# Patient Record
Sex: MALE | Race: WHITE | NOT HISPANIC OR LATINO | Employment: FULL TIME | URBAN - METROPOLITAN AREA
[De-identification: names, ages, dates, MRNs, and addresses within clinical notes are randomized per-mention and may not be internally consistent; named-entity substitution may affect disease eponyms.]

---

## 2023-03-30 DIAGNOSIS — G47.19 EXCESSIVE DAYTIME SLEEPINESS: ICD-10-CM

## 2023-03-31 PROBLEM — F41.9 ANXIETY: Status: ACTIVE | Noted: 2023-03-31

## 2023-03-31 PROBLEM — E78.5 HYPERLIPIDEMIA: Status: ACTIVE | Noted: 2023-03-31

## 2023-03-31 PROBLEM — K58.9 IBS (IRRITABLE BOWEL SYNDROME): Status: ACTIVE | Noted: 2023-03-31

## 2023-03-31 PROBLEM — R06.2 WHEEZE: Status: ACTIVE | Noted: 2023-03-31

## 2023-03-31 PROBLEM — I10 HYPERTENSION: Status: ACTIVE | Noted: 2023-03-31

## 2023-03-31 PROBLEM — R53.83 FATIGUE: Status: ACTIVE | Noted: 2023-03-31

## 2023-03-31 PROBLEM — G47.19 EXCESSIVE DAYTIME SLEEPINESS: Status: ACTIVE | Noted: 2023-03-31

## 2023-03-31 PROBLEM — N40.1 ENLARGED PROSTATE WITH LOWER URINARY TRACT SYMPTOMS (LUTS): Status: ACTIVE | Noted: 2023-03-31

## 2023-03-31 PROBLEM — K21.9 GERD (GASTROESOPHAGEAL REFLUX DISEASE): Status: ACTIVE | Noted: 2023-03-31

## 2023-03-31 PROBLEM — N52.9 ED (ERECTILE DYSFUNCTION): Status: ACTIVE | Noted: 2023-03-31

## 2023-03-31 PROBLEM — R61 HYPERHIDROSIS: Status: ACTIVE | Noted: 2023-03-31

## 2023-03-31 PROBLEM — G47.33 OSA (OBSTRUCTIVE SLEEP APNEA): Status: ACTIVE | Noted: 2023-03-31

## 2023-03-31 RX ORDER — GLYCOPYRROLATE 1 MG/1
1 TABLET ORAL 3 TIMES DAILY
COMMUNITY
Start: 2021-12-14

## 2023-03-31 RX ORDER — MODAFINIL 200 MG/1
TABLET ORAL
Qty: 90 TABLET | Refills: 1 | Status: SHIPPED | OUTPATIENT
Start: 2023-03-31 | End: 2024-01-03

## 2023-03-31 RX ORDER — ALBUTEROL SULFATE 90 UG/1
2 AEROSOL, METERED RESPIRATORY (INHALATION) EVERY 4 HOURS PRN
COMMUNITY
Start: 2019-05-01

## 2023-03-31 RX ORDER — EPINEPHRINE 0.3 MG/.3ML
1 INJECTION SUBCUTANEOUS AS NEEDED
COMMUNITY
Start: 2014-08-13

## 2023-03-31 RX ORDER — SODIUM FLUORIDE/POTASSIUM NIT 1.1 %-5 %
1 PASTE (ML) DENTAL 3 TIMES DAILY
COMMUNITY
Start: 2021-08-10

## 2023-03-31 RX ORDER — SILDENAFIL 100 MG/1
.5-1 TABLET, FILM COATED ORAL AS NEEDED
COMMUNITY
End: 2023-11-01

## 2023-03-31 RX ORDER — VERAPAMIL HYDROCHLORIDE 120 MG/1
120 TABLET, FILM COATED, EXTENDED RELEASE ORAL NIGHTLY
COMMUNITY
Start: 2023-02-06 | End: 2023-11-01

## 2023-03-31 RX ORDER — LOSARTAN POTASSIUM 100 MG/1
100 TABLET ORAL DAILY
COMMUNITY
End: 2023-08-14

## 2023-03-31 RX ORDER — MODAFINIL 200 MG/1
200 TABLET ORAL DAILY
COMMUNITY
End: 2023-03-31 | Stop reason: SDUPTHER

## 2023-03-31 RX ORDER — FELODIPINE 10 MG/1
1 TABLET, EXTENDED RELEASE ORAL DAILY
COMMUNITY
Start: 2023-02-06 | End: 2023-11-01

## 2023-03-31 RX ORDER — ESCITALOPRAM OXALATE 20 MG/1
40 TABLET ORAL DAILY
COMMUNITY
End: 2023-04-14

## 2023-03-31 NOTE — TELEPHONE ENCOUNTER
I have personally reviewed the OARRS report for this patient.  I have considered the risks of abuse, dependence, addiction, and diversion.  I believe that it is clinically appropriate for this patient to be prescribed this medication based on the documented diagnosis.  Mil Steele MD

## 2023-04-13 DIAGNOSIS — F41.9 ANXIETY: ICD-10-CM

## 2023-04-14 RX ORDER — ESCITALOPRAM OXALATE 20 MG/1
TABLET ORAL
Qty: 60 TABLET | Refills: 11 | Status: SHIPPED | OUTPATIENT
Start: 2023-04-14 | End: 2024-04-22 | Stop reason: SDUPTHER

## 2023-05-06 PROBLEM — Z00.00 WELL ADULT EXAM: Status: ACTIVE | Noted: 2023-05-06

## 2023-05-06 PROBLEM — R06.2 WHEEZE: Status: RESOLVED | Noted: 2023-03-31 | Resolved: 2023-05-06

## 2023-05-06 PROBLEM — G44.019 EPISODIC CLUSTER HEADACHE, NOT INTRACTABLE: Status: ACTIVE | Noted: 2023-05-06

## 2023-08-12 DIAGNOSIS — I10 PRIMARY HYPERTENSION: ICD-10-CM

## 2023-08-14 RX ORDER — LOSARTAN POTASSIUM 100 MG/1
100 TABLET ORAL DAILY
Qty: 90 TABLET | Refills: 3 | Status: SHIPPED | OUTPATIENT
Start: 2023-08-14 | End: 2024-05-06

## 2023-10-31 DIAGNOSIS — I10 PRIMARY HYPERTENSION: ICD-10-CM

## 2023-10-31 DIAGNOSIS — N52.9 ERECTILE DYSFUNCTION, UNSPECIFIED ERECTILE DYSFUNCTION TYPE: ICD-10-CM

## 2023-11-01 RX ORDER — VERAPAMIL HYDROCHLORIDE 120 MG/1
120 TABLET, FILM COATED, EXTENDED RELEASE ORAL NIGHTLY
Qty: 90 TABLET | Refills: 1 | Status: SHIPPED | OUTPATIENT
Start: 2023-11-01 | End: 2024-05-06

## 2023-11-01 RX ORDER — SILDENAFIL 100 MG/1
TABLET, FILM COATED ORAL
Qty: 30 TABLET | Refills: 11 | Status: SHIPPED | OUTPATIENT
Start: 2023-11-01

## 2023-11-01 RX ORDER — FELODIPINE 10 MG/1
TABLET, EXTENDED RELEASE ORAL
Qty: 90 TABLET | Refills: 1 | Status: SHIPPED | OUTPATIENT
Start: 2023-11-01 | End: 2024-06-05 | Stop reason: SDUPTHER

## 2023-11-22 ENCOUNTER — LAB (OUTPATIENT)
Dept: LAB | Facility: LAB | Age: 63
End: 2023-11-22
Payer: COMMERCIAL

## 2023-11-22 ENCOUNTER — OFFICE VISIT (OUTPATIENT)
Dept: PRIMARY CARE | Facility: CLINIC | Age: 63
End: 2023-11-22
Payer: COMMERCIAL

## 2023-11-22 VITALS
BODY MASS INDEX: 29.19 KG/M2 | HEART RATE: 117 BPM | HEIGHT: 68 IN | TEMPERATURE: 97.6 F | WEIGHT: 192.6 LBS | DIASTOLIC BLOOD PRESSURE: 90 MMHG | SYSTOLIC BLOOD PRESSURE: 140 MMHG | RESPIRATION RATE: 16 BRPM | OXYGEN SATURATION: 94 %

## 2023-11-22 DIAGNOSIS — G44.019 EPISODIC CLUSTER HEADACHE, NOT INTRACTABLE: ICD-10-CM

## 2023-11-22 DIAGNOSIS — G47.33 OSA (OBSTRUCTIVE SLEEP APNEA): ICD-10-CM

## 2023-11-22 DIAGNOSIS — Z12.5 SPECIAL SCREENING FOR MALIGNANT NEOPLASM OF PROSTATE: ICD-10-CM

## 2023-11-22 DIAGNOSIS — E78.5 HYPERLIPIDEMIA, UNSPECIFIED HYPERLIPIDEMIA TYPE: Primary | ICD-10-CM

## 2023-11-22 DIAGNOSIS — Z00.00 WELL ADULT EXAM: ICD-10-CM

## 2023-11-22 DIAGNOSIS — I10 PRIMARY HYPERTENSION: ICD-10-CM

## 2023-11-22 LAB
ERYTHROCYTE [DISTWIDTH] IN BLOOD BY AUTOMATED COUNT: 12.5 % (ref 11.5–14.5)
HCT VFR BLD AUTO: 42.4 % (ref 41–52)
HGB BLD-MCNC: 14.7 G/DL (ref 13.5–17.5)
MCH RBC QN AUTO: 30.9 PG (ref 26–34)
MCHC RBC AUTO-ENTMCNC: 34.7 G/DL (ref 32–36)
MCV RBC AUTO: 89 FL (ref 80–100)
NRBC BLD-RTO: 0 /100 WBCS (ref 0–0)
PLATELET # BLD AUTO: 266 X10*3/UL (ref 150–450)
RBC # BLD AUTO: 4.75 X10*6/UL (ref 4.5–5.9)
WBC # BLD AUTO: 5.7 X10*3/UL (ref 4.4–11.3)

## 2023-11-22 PROCEDURE — 80053 COMPREHEN METABOLIC PANEL: CPT

## 2023-11-22 PROCEDURE — 36415 COLL VENOUS BLD VENIPUNCTURE: CPT

## 2023-11-22 PROCEDURE — 80061 LIPID PANEL: CPT

## 2023-11-22 PROCEDURE — 3077F SYST BP >= 140 MM HG: CPT | Performed by: FAMILY MEDICINE

## 2023-11-22 PROCEDURE — 84153 ASSAY OF PSA TOTAL: CPT

## 2023-11-22 PROCEDURE — 1036F TOBACCO NON-USER: CPT | Performed by: FAMILY MEDICINE

## 2023-11-22 PROCEDURE — 85027 COMPLETE CBC AUTOMATED: CPT

## 2023-11-22 PROCEDURE — 3080F DIAST BP >= 90 MM HG: CPT | Performed by: FAMILY MEDICINE

## 2023-11-22 PROCEDURE — 99396 PREV VISIT EST AGE 40-64: CPT | Performed by: FAMILY MEDICINE

## 2023-11-22 ASSESSMENT — ENCOUNTER SYMPTOMS
COUGH: 0
NECK STIFFNESS: 0
HALLUCINATIONS: 0
WEAKNESS: 0
EYE REDNESS: 0
DYSURIA: 0
BACK PAIN: 0
OCCASIONAL FEELINGS OF UNSTEADINESS: 0
CHILLS: 0
WOUND: 0
RHINORRHEA: 0
FEVER: 0
HEADACHES: 0
ABDOMINAL PAIN: 0
SHORTNESS OF BREATH: 0
ADENOPATHY: 0
BLOOD IN STOOL: 0
DEPRESSION: 0
LOSS OF SENSATION IN FEET: 0
PALPITATIONS: 0
FATIGUE: 0
HEMATURIA: 0
SORE THROAT: 0
POLYDIPSIA: 0
BRUISES/BLEEDS EASILY: 0
EYE PAIN: 0

## 2023-11-22 ASSESSMENT — PATIENT HEALTH QUESTIONNAIRE - PHQ9
1. LITTLE INTEREST OR PLEASURE IN DOING THINGS: NOT AT ALL
SUM OF ALL RESPONSES TO PHQ9 QUESTIONS 1 AND 2: 0
2. FEELING DOWN, DEPRESSED OR HOPELESS: NOT AT ALL

## 2023-11-22 NOTE — PROGRESS NOTES
Jeff Rayo is a 63 y.o. male with Chief Complaint of Annual Exam (CPE).  And recheck on HTN, HLD.     Past Surgical History:   Procedure Laterality Date    COLONOSCOPY  05/07/2014    Complete Colonoscopy    ESOPHAGOGASTRODUODENOSCOPY  05/07/2014    Diagnostic Esophagogastroduodenoscopy    KNEE ARTHROSCOPY W/ DEBRIDEMENT  05/07/2014    Arthroscopy Knee Right    MR HEAD ANGIO WO IV CONTRAST  6/1/2014    MR HEAD ANGIO WO IV CONTRAST 6/1/2014 [facility]    MR HEAD ANGIO WO IV CONTRAST  6/1/2014    MR HEAD ANGIO WO IV CONTRAST 6/1/2014 [facility]    MR NECK ANGIO WO IV CONTRAST  6/1/2014    MR NECK ANGIO WO IV CONTRAST 6/1/2014 [facility]      Social History     Socioeconomic History    Marital status:      Spouse name: Not on file    Number of children: Not on file    Years of education: Not on file    Highest education level: Not on file   Occupational History    Not on file   Tobacco Use    Smoking status: Never     Passive exposure: Never    Smokeless tobacco: Never   Vaping Use    Vaping Use: Never used   Substance and Sexual Activity    Alcohol use: Yes    Drug use: Never    Sexual activity: Not on file   Other Topics Concern    Not on file   Social History Narrative    Not on file     Social Determinants of Health     Financial Resource Strain: Not on file   Food Insecurity: Not on file   Transportation Needs: Not on file   Physical Activity: Not on file   Stress: Not on file   Social Connections: Not on file   Intimate Partner Violence: Not on file   Housing Stability: Not on file     Past Medical History:   Diagnosis Date    Noninfective gastroenteritis and colitis, unspecified 08/17/2016    Colitis    Personal history of diseases of the skin and subcutaneous tissue 10/22/2014    History of urticaria    Personal history of diseases of the skin and subcutaneous tissue 06/03/2015    History of urticaria    Personal history of other diseases of the digestive system  "10/21/2019    History of dental abscess    Personal history of other diseases of the nervous system and sense organs 05/03/2021    History of cluster headache    Personal history of other diseases of the respiratory system 03/09/2016    History of sinusitis    Unspecified symptoms and signs involving the genitourinary system 09/27/2019    UTI symptoms      No family history on file.     Review of Systems   Constitutional:  Negative for chills, fatigue and fever.   HENT:  Negative for rhinorrhea and sore throat.    Eyes:  Negative for pain and redness.   Respiratory:  Negative for cough and shortness of breath.    Cardiovascular:  Negative for chest pain and palpitations.   Gastrointestinal:  Negative for abdominal pain and blood in stool.   Endocrine: Negative for polydipsia and polyuria.   Genitourinary:  Negative for dysuria and hematuria.   Musculoskeletal:  Negative for back pain and neck stiffness.   Skin:  Negative for rash and wound.   Allergic/Immunologic: Negative for environmental allergies and food allergies.   Neurological:  Negative for weakness and headaches.   Hematological:  Negative for adenopathy. Does not bruise/bleed easily.   Psychiatric/Behavioral:  Negative for hallucinations and suicidal ideas.       /90 (BP Location: Left arm, Patient Position: Sitting, BP Cuff Size: Adult)   Pulse (!) 117   Temp 36.4 °C (97.6 °F) (Temporal)   Resp 16   Ht 1.727 m (5' 8\")   Wt 87.4 kg (192 lb 9.6 oz)   SpO2 94%   BMI 29.28 kg/m²   Physical Exam  Vitals reviewed.   Constitutional:       General: He is not in acute distress.     Appearance: He is not ill-appearing.   HENT:      Head: Normocephalic and atraumatic.      Right Ear: Tympanic membrane normal.      Left Ear: Tympanic membrane normal.      Nose: No congestion or rhinorrhea.      Mouth/Throat:      Pharynx: No oropharyngeal exudate or posterior oropharyngeal erythema.   Eyes:      Extraocular Movements: Extraocular movements intact.      " "Conjunctiva/sclera: Conjunctivae normal.      Pupils: Pupils are equal, round, and reactive to light.   Cardiovascular:      Rate and Rhythm: Normal rate and regular rhythm.      Heart sounds: No murmur heard.     No friction rub. No gallop.   Pulmonary:      Effort: Pulmonary effort is normal.      Breath sounds: Normal breath sounds. No wheezing, rhonchi or rales.   Abdominal:      General: There is no distension.      Palpations: Abdomen is soft.      Tenderness: There is no abdominal tenderness. There is no guarding or rebound.   Musculoskeletal:         General: No swelling or deformity.      Cervical back: Normal range of motion and neck supple.      Right lower leg: No edema.      Left lower leg: No edema.   Skin:     Capillary Refill: Capillary refill takes less than 2 seconds.      Coloration: Skin is not jaundiced.      Findings: No rash.   Neurological:      General: No focal deficit present.      Mental Status: He is alert.      Motor: No weakness.   Psychiatric:         Mood and Affect: Mood normal.         Behavior: Behavior normal.       Lab Results   Component Value Date    WBC 5.4 07/14/2022    HGB 15.3 07/14/2022    HCT 42.6 07/14/2022    MCV 88 07/14/2022     07/14/2022     Lab Results   Component Value Date    CHOL 197 07/13/2022    CHOL 197 11/04/2021    CHOL 209 (H) 10/28/2020     Lab Results   Component Value Date    HDL 86.1 07/13/2022    HDL 76.6 11/04/2021    HDL 97.7 10/28/2020     No results found for: \"LDLCALC\"  Lab Results   Component Value Date    TRIG 50 07/13/2022    TRIG 54 11/04/2021    TRIG 49 10/28/2020     No components found for: \"CHOLHDL\"  No results found for: \"HGBA1C\"    Assessment/Plan   Problem List Items Addressed This Visit       Hyperlipidemia - Primary    Overview     # Fatty liver on CT/US 2016 -- working on losing weight. Has kept 10# off since 2015 and will work on more.          Hypertension    Overview     losartan to 100 mg daily.   Cut back on verapamil " from 180 to 120 ER. (chosen for treatment of cluster headaches).   felodipine to 10 mg daily.          Current Assessment & Plan     Continue current meds.   On 2 CCB for headache control.          MARYANN (obstructive sleep apnea)    Overview     Chronic fatigue due to MARYANN (cannot tolerate CPAP)    Tolerating modafanil.   Stimulant agreement signed 5/4/22.   OARRS reviewed 11/22/23  Tox screen clean 5/4/22     Referred to Dr Sheth to consider Inspire device.          Well adult exam    Overview     11/22/23 Preventative Visit -- declines flu shot and tdap next due 2029. Counseled on healthy diet and exercise.     Colonoscopy (hyperplastic polyp) 2016 -- next due 2026.  Retired at age 57 -- staying part time at [employer], and working at [employer].     Healthful alcohol intake 2 or fewer beers a day.     2/2/2013 ct chest -- Stable pulmonary nodules. 3 mm nodule in rul lateral; 4 mm rul anterior. RLL calcified granuloma. Stable from 2012.     11/10/2021 CACS ZERO!!! no need for statin. MP          Relevant Orders    Lipid panel    Comprehensive metabolic panel    CBC    Episodic cluster headache, not intractable    Overview     Cluster Headaches -- improved with up titration of verapamil.   Pulse holding steady.   Use of prn ibuprofen and maxalt helps.           Other Visit Diagnoses       Special screening for malignant neoplasm of prostate        Relevant Orders    Prostate Specific Antigen

## 2023-11-23 LAB
ALBUMIN SERPL BCP-MCNC: 4.5 G/DL (ref 3.4–5)
ALP SERPL-CCNC: 80 U/L (ref 33–136)
ALT SERPL W P-5'-P-CCNC: 25 U/L (ref 10–52)
ANION GAP SERPL CALC-SCNC: 12 MMOL/L (ref 10–20)
AST SERPL W P-5'-P-CCNC: 19 U/L (ref 9–39)
BILIRUB SERPL-MCNC: 0.5 MG/DL (ref 0–1.2)
BUN SERPL-MCNC: 17 MG/DL (ref 6–23)
CALCIUM SERPL-MCNC: 9.5 MG/DL (ref 8.6–10.6)
CHLORIDE SERPL-SCNC: 106 MMOL/L (ref 98–107)
CHOLEST SERPL-MCNC: 237 MG/DL (ref 0–199)
CHOLESTEROL/HDL RATIO: 2.4
CO2 SERPL-SCNC: 28 MMOL/L (ref 21–32)
CREAT SERPL-MCNC: 0.85 MG/DL (ref 0.5–1.3)
GFR SERPL CREATININE-BSD FRML MDRD: >90 ML/MIN/1.73M*2
GLUCOSE SERPL-MCNC: 87 MG/DL (ref 74–99)
HDLC SERPL-MCNC: 98 MG/DL
LDLC SERPL CALC-MCNC: 124 MG/DL
NON HDL CHOLESTEROL: 139 MG/DL (ref 0–149)
POTASSIUM SERPL-SCNC: 4.2 MMOL/L (ref 3.5–5.3)
PROT SERPL-MCNC: 6.7 G/DL (ref 6.4–8.2)
PSA SERPL-MCNC: 0.62 NG/ML
SODIUM SERPL-SCNC: 142 MMOL/L (ref 136–145)
TRIGL SERPL-MCNC: 75 MG/DL (ref 0–149)
VLDL: 15 MG/DL (ref 0–40)

## 2023-12-30 DIAGNOSIS — G47.19 EXCESSIVE DAYTIME SLEEPINESS: ICD-10-CM

## 2024-01-03 RX ORDER — MODAFINIL 200 MG/1
TABLET ORAL
Qty: 90 TABLET | Refills: 3 | Status: SHIPPED | OUTPATIENT
Start: 2024-01-03 | End: 2024-07-01

## 2024-04-22 DIAGNOSIS — F41.9 ANXIETY: ICD-10-CM

## 2024-04-23 RX ORDER — ESCITALOPRAM OXALATE 20 MG/1
40 TABLET ORAL DAILY
Qty: 60 TABLET | Refills: 11 | Status: SHIPPED | OUTPATIENT
Start: 2024-04-23

## 2024-05-04 DIAGNOSIS — I10 PRIMARY HYPERTENSION: ICD-10-CM

## 2024-05-06 RX ORDER — VERAPAMIL HYDROCHLORIDE 120 MG/1
120 TABLET, FILM COATED, EXTENDED RELEASE ORAL NIGHTLY
Qty: 90 TABLET | Refills: 3 | Status: SHIPPED | OUTPATIENT
Start: 2024-05-06

## 2024-05-06 RX ORDER — LOSARTAN POTASSIUM 100 MG/1
100 TABLET ORAL DAILY
Qty: 90 TABLET | Refills: 3 | Status: SHIPPED | OUTPATIENT
Start: 2024-05-06

## 2024-05-08 ENCOUNTER — APPOINTMENT (OUTPATIENT)
Dept: PRIMARY CARE | Facility: CLINIC | Age: 64
End: 2024-05-08
Payer: COMMERCIAL

## 2024-06-05 ENCOUNTER — OFFICE VISIT (OUTPATIENT)
Dept: PRIMARY CARE | Facility: CLINIC | Age: 64
End: 2024-06-05
Payer: COMMERCIAL

## 2024-06-05 VITALS
OXYGEN SATURATION: 95 % | DIASTOLIC BLOOD PRESSURE: 80 MMHG | RESPIRATION RATE: 16 BRPM | BODY MASS INDEX: 29.94 KG/M2 | SYSTOLIC BLOOD PRESSURE: 122 MMHG | HEART RATE: 65 BPM | WEIGHT: 196.9 LBS

## 2024-06-05 DIAGNOSIS — G47.33 OSA (OBSTRUCTIVE SLEEP APNEA): ICD-10-CM

## 2024-06-05 DIAGNOSIS — Z79.899 DRUG THERAPY: ICD-10-CM

## 2024-06-05 DIAGNOSIS — F41.9 ANXIETY: ICD-10-CM

## 2024-06-05 DIAGNOSIS — E78.5 HYPERLIPIDEMIA, UNSPECIFIED HYPERLIPIDEMIA TYPE: Primary | ICD-10-CM

## 2024-06-05 DIAGNOSIS — I10 PRIMARY HYPERTENSION: ICD-10-CM

## 2024-06-05 DIAGNOSIS — K58.1 IRRITABLE BOWEL SYNDROME WITH CONSTIPATION: ICD-10-CM

## 2024-06-05 DIAGNOSIS — G44.019 EPISODIC CLUSTER HEADACHE, NOT INTRACTABLE: ICD-10-CM

## 2024-06-05 PROCEDURE — 80354 DRUG SCREENING FENTANYL: CPT

## 2024-06-05 PROCEDURE — 3079F DIAST BP 80-89 MM HG: CPT | Performed by: FAMILY MEDICINE

## 2024-06-05 PROCEDURE — 82570 ASSAY OF URINE CREATININE: CPT

## 2024-06-05 PROCEDURE — 80365 DRUG SCREENING OXYCODONE: CPT

## 2024-06-05 PROCEDURE — 80346 BENZODIAZEPINES1-12: CPT

## 2024-06-05 PROCEDURE — 80358 DRUG SCREENING METHADONE: CPT

## 2024-06-05 PROCEDURE — 80373 DRUG SCREENING TRAMADOL: CPT

## 2024-06-05 PROCEDURE — 80361 OPIATES 1 OR MORE: CPT

## 2024-06-05 PROCEDURE — 3074F SYST BP LT 130 MM HG: CPT | Performed by: FAMILY MEDICINE

## 2024-06-05 PROCEDURE — 80307 DRUG TEST PRSMV CHEM ANLYZR: CPT

## 2024-06-05 PROCEDURE — 99214 OFFICE O/P EST MOD 30 MIN: CPT | Performed by: FAMILY MEDICINE

## 2024-06-05 PROCEDURE — 80368 SEDATIVE HYPNOTICS: CPT

## 2024-06-05 RX ORDER — FELODIPINE 5 MG/1
5 TABLET, EXTENDED RELEASE ORAL DAILY
Qty: 90 TABLET | Refills: 3 | Status: SHIPPED | OUTPATIENT
Start: 2024-06-05 | End: 2025-06-05

## 2024-06-05 NOTE — ASSESSMENT & PLAN NOTE
losartan to 100 mg daily.   Tolerated cut in verapamil from 180 to 120 ER. (chosen for treatment of cluster headaches).   Cut felodipine to 5 mg daily. (To wean off duplicate CCB)

## 2024-06-05 NOTE — PROGRESS NOTES
Subjective   Patient ID: Jeff Rayo is a 64 y.o. male who presents for Follow-up (6 month ).  Recheck on chronic stable HTN, HLD and Migraines.     OARRS:  Mil Steele MD on 6/5/2024 10:43 AM  I have personally reviewed the OARRS report for Jeff Rayo. I have considered the risks of abuse, dependence, addiction and diversion and I believe that it is clinically appropriate for Jeff Rayo to be prescribed this medication    Is the patient prescribed a combination of a benzodiazepine and opioid?  No    Last Urine Drug Screen / ordered today: Yes  No results found for this or any previous visit (from the past 8760 hour(s)).  Results are as expected.     Clinical rationale for not completing a Urine Drug Screen: ordered today 6/5/24      Controlled Substance Agreement:  Date of the Last Agreement: 6/5/24  Reviewed Controlled Substance Agreement including but not limited to the benefits, risks, and alternatives to treatment with a Controlled Substance medication(s).    Stimulants:   What is the patient's goal of therapy? Increased alertness during work  Is this being achieved with current treatment? yes    Activities of Daily Living:   Is your overall impression that this patient is benefiting (symptom reduction outweighs side effects) from stimulant therapy? Yes     1. Physical Functioning: Better  2. Family Relationship: Same  3. Social Relationship: Same  4. Mood: Same  5. Sleep Patterns: Same  6. Overall Function: Better      Objective   Visit Vitals  /80 (BP Location: Left arm, Patient Position: Sitting, BP Cuff Size: Adult)   Pulse 65   Resp 16   Wt 89.3 kg (196 lb 14.4 oz)   SpO2 95%   BMI 29.94 kg/m²   Smoking Status Never   BSA 2.07 m²      O: VSS AFEB Awake, Alert, NAD.  No intoxication, withdrawal, or sedation.  Chest CTA.  Heart RRR.  Ext no c/c/e.      Lab Results   Component Value Date    WBC 5.7 11/22/2023    HGB 14.7 11/22/2023    HCT 42.4 11/22/2023    MCV 89  "11/22/2023     11/22/2023       Chemistry    Lab Results   Component Value Date/Time     11/22/2023 1439    K 4.2 11/22/2023 1439     11/22/2023 1439    CO2 28 11/22/2023 1439    BUN 17 11/22/2023 1439    CREATININE 0.85 11/22/2023 1439    Lab Results   Component Value Date/Time    CALCIUM 9.5 11/22/2023 1439    ALKPHOS 80 11/22/2023 1439    AST 19 11/22/2023 1439    ALT 25 11/22/2023 1439    BILITOT 0.5 11/22/2023 1439          Lab Results   Component Value Date    CHOL 237 (H) 11/22/2023    CHOL 197 07/13/2022    CHOL 197 11/04/2021     Lab Results   Component Value Date    HDL 98.0 11/22/2023    HDL 86.1 07/13/2022    HDL 76.6 11/04/2021     Lab Results   Component Value Date    LDLCALC 124 (H) 11/22/2023     Lab Results   Component Value Date    TRIG 75 11/22/2023    TRIG 50 07/13/2022    TRIG 54 11/04/2021     No components found for: \"CHOLHDL\"   No results found for: \"HGBA1C\"    Assessment/Plan   Problem List Items Addressed This Visit       Anxiety    Overview     Did not tolerate prozac, welbutrin, effexor in the past.          Current Assessment & Plan      improved on increase of lexapro 20 mg 2 at night (40 mg) to get through shock of his wife leaving 6/2020 but better on just one a day now.         Hyperlipidemia - Primary    Overview     11/10/21 CACS Zero  11/2023 MERRITT 2%         Current Assessment & Plan     No statin needed at this time.  Continue to work on weight loss and exercise.          Hypertension    Overview     Choice of verapamil for control of cluster headaches.          Current Assessment & Plan     losartan to 100 mg daily.   Tolerated cut in verapamil from 180 to 120 ER. (chosen for treatment of cluster headaches).   Cut felodipine to 5 mg daily. (To wean off duplicate CCB)         IBS (irritable bowel syndrome)    Overview     # IBS - D         Current Assessment & Plan     uses otc anti-diarrheal prn   Improved with cutting back on energy drinks.   Already avoiding " dairy.   Tried pantoprazole from Dr Kate 2016 but this caused worsening diarrhea.   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   OARRS reviewed 11/22/23  Tox screen clean 5/4/22     Referred to Dr ANABELL Verma to consider Inspire device.          Episodic cluster headache, not intractable    Current Assessment & Plan     Stable on verapamil for prevention.   Use of prn ibuprofen and maxalt helps.           Other Visit Diagnoses       Drug therapy

## 2024-06-05 NOTE — ASSESSMENT & PLAN NOTE
uses otc anti-diarrheal prn   Improved with cutting back on energy drinks.   Already avoiding dairy.   Tried pantoprazole from Dr Kate 2016 but this caused worsening diarrhea.   Did not tolerate lomotil in the past (2020)

## 2024-06-05 NOTE — ASSESSMENT & PLAN NOTE
improved on increase of lexapro 20 mg 2 at night (40 mg) to get through shock of his wife leaving 6/2020 but better on just one a day now.

## 2024-06-06 LAB
AMPHETAMINES UR QL SCN: NORMAL
BARBITURATES UR QL SCN: NORMAL
BZE UR QL SCN: NORMAL
CANNABINOIDS UR QL SCN: NORMAL
CREAT UR-MCNC: 135.5 MG/DL (ref 20–370)
PCP UR QL SCN: NORMAL

## 2024-07-28 DIAGNOSIS — G47.19 EXCESSIVE DAYTIME SLEEPINESS: ICD-10-CM

## 2024-07-29 RX ORDER — MODAFINIL 200 MG/1
200 TABLET ORAL DAILY
Qty: 90 TABLET | Refills: 2 | Status: SHIPPED | OUTPATIENT
Start: 2024-07-29 | End: 2025-01-25

## 2024-08-28 ENCOUNTER — TELEPHONE (OUTPATIENT)
Dept: PRIMARY CARE | Facility: CLINIC | Age: 64
End: 2024-08-28
Payer: COMMERCIAL

## 2024-08-28 NOTE — TELEPHONE ENCOUNTER
Pt called stating he received a for 431 dollars for lab work for drug test. Informed pt he is on a controlled medication which requires those test. Pt is still not understanding why he was tested and stated he wasn't aware that he was leaving a urine sample for that. Pt is requesting a call for someone to explain.

## 2024-10-25 DIAGNOSIS — I10 PRIMARY HYPERTENSION: Primary | ICD-10-CM

## 2024-10-28 DIAGNOSIS — N52.9 ERECTILE DYSFUNCTION, UNSPECIFIED ERECTILE DYSFUNCTION TYPE: ICD-10-CM

## 2024-10-28 RX ORDER — FELODIPINE 10 MG/1
TABLET, EXTENDED RELEASE ORAL
Qty: 90 TABLET | Refills: 3 | Status: SHIPPED | OUTPATIENT
Start: 2024-10-28

## 2024-10-28 RX ORDER — SILDENAFIL 100 MG/1
TABLET, FILM COATED ORAL
Qty: 30 TABLET | Refills: 3 | Status: SHIPPED | OUTPATIENT
Start: 2024-10-28

## 2024-11-27 ENCOUNTER — APPOINTMENT (OUTPATIENT)
Dept: PRIMARY CARE | Facility: CLINIC | Age: 64
End: 2024-11-27
Payer: COMMERCIAL

## 2024-11-27 ENCOUNTER — LAB (OUTPATIENT)
Dept: LAB | Facility: LAB | Age: 64
End: 2024-11-27
Payer: COMMERCIAL

## 2024-11-27 VITALS
OXYGEN SATURATION: 95 % | WEIGHT: 194 LBS | TEMPERATURE: 97.5 F | BODY MASS INDEX: 29.5 KG/M2 | SYSTOLIC BLOOD PRESSURE: 140 MMHG | HEART RATE: 66 BPM | DIASTOLIC BLOOD PRESSURE: 100 MMHG

## 2024-11-27 DIAGNOSIS — R06.2 WHEEZE: Primary | ICD-10-CM

## 2024-11-27 DIAGNOSIS — E78.5 HYPERLIPIDEMIA, UNSPECIFIED HYPERLIPIDEMIA TYPE: ICD-10-CM

## 2024-11-27 DIAGNOSIS — Z12.5 SCREENING FOR PROSTATE CANCER: ICD-10-CM

## 2024-11-27 DIAGNOSIS — Z00.00 WELL ADULT EXAM: ICD-10-CM

## 2024-11-27 DIAGNOSIS — K21.9 GASTROESOPHAGEAL REFLUX DISEASE WITHOUT ESOPHAGITIS: ICD-10-CM

## 2024-11-27 DIAGNOSIS — R35.0 BENIGN PROSTATIC HYPERPLASIA WITH URINARY FREQUENCY: ICD-10-CM

## 2024-11-27 DIAGNOSIS — M62.830 SPASM OF RIGHT TRAPEZIUS MUSCLE: ICD-10-CM

## 2024-11-27 DIAGNOSIS — Z79.899 DRUG THERAPY: ICD-10-CM

## 2024-11-27 DIAGNOSIS — G47.33 OSA (OBSTRUCTIVE SLEEP APNEA): ICD-10-CM

## 2024-11-27 DIAGNOSIS — G47.19 EXCESSIVE DAYTIME SLEEPINESS: ICD-10-CM

## 2024-11-27 DIAGNOSIS — N52.9 ERECTILE DYSFUNCTION, UNSPECIFIED ERECTILE DYSFUNCTION TYPE: ICD-10-CM

## 2024-11-27 DIAGNOSIS — N40.1 BENIGN PROSTATIC HYPERPLASIA WITH URINARY FREQUENCY: ICD-10-CM

## 2024-11-27 LAB
ALBUMIN SERPL BCP-MCNC: 4.5 G/DL (ref 3.4–5)
ALP SERPL-CCNC: 85 U/L (ref 33–136)
ALT SERPL W P-5'-P-CCNC: 32 U/L (ref 10–52)
ANION GAP SERPL CALC-SCNC: 13 MMOL/L (ref 10–20)
AST SERPL W P-5'-P-CCNC: 20 U/L (ref 9–39)
BILIRUB SERPL-MCNC: 0.8 MG/DL (ref 0–1.2)
BUN SERPL-MCNC: 17 MG/DL (ref 6–23)
CALCIUM SERPL-MCNC: 9.1 MG/DL (ref 8.6–10.3)
CHLORIDE SERPL-SCNC: 100 MMOL/L (ref 98–107)
CHOLEST SERPL-MCNC: 226 MG/DL (ref 0–199)
CHOLESTEROL/HDL RATIO: 2.5
CO2 SERPL-SCNC: 29 MMOL/L (ref 21–32)
CREAT SERPL-MCNC: 0.84 MG/DL (ref 0.5–1.3)
EGFRCR SERPLBLD CKD-EPI 2021: >90 ML/MIN/1.73M*2
ERYTHROCYTE [DISTWIDTH] IN BLOOD BY AUTOMATED COUNT: 12.8 % (ref 11.5–14.5)
GLUCOSE SERPL-MCNC: 89 MG/DL (ref 74–99)
HCT VFR BLD AUTO: 47 % (ref 41–52)
HDLC SERPL-MCNC: 88.7 MG/DL
HGB BLD-MCNC: 15.7 G/DL (ref 13.5–17.5)
LDLC SERPL CALC-MCNC: 127 MG/DL
MCH RBC QN AUTO: 30.3 PG (ref 26–34)
MCHC RBC AUTO-ENTMCNC: 33.4 G/DL (ref 32–36)
MCV RBC AUTO: 91 FL (ref 80–100)
NON HDL CHOLESTEROL: 137 MG/DL (ref 0–149)
NRBC BLD-RTO: 0 /100 WBCS (ref 0–0)
PLATELET # BLD AUTO: 248 X10*3/UL (ref 150–450)
POTASSIUM SERPL-SCNC: 4 MMOL/L (ref 3.5–5.3)
PROT SERPL-MCNC: 6.7 G/DL (ref 6.4–8.2)
RBC # BLD AUTO: 5.19 X10*6/UL (ref 4.5–5.9)
SODIUM SERPL-SCNC: 138 MMOL/L (ref 136–145)
TRIGL SERPL-MCNC: 50 MG/DL (ref 0–149)
VLDL: 10 MG/DL (ref 0–40)
WBC # BLD AUTO: 5.9 X10*3/UL (ref 4.4–11.3)

## 2024-11-27 PROCEDURE — 36415 COLL VENOUS BLD VENIPUNCTURE: CPT

## 2024-11-27 PROCEDURE — 80053 COMPREHEN METABOLIC PANEL: CPT

## 2024-11-27 PROCEDURE — 84153 ASSAY OF PSA TOTAL: CPT

## 2024-11-27 PROCEDURE — 3080F DIAST BP >= 90 MM HG: CPT | Performed by: FAMILY MEDICINE

## 2024-11-27 PROCEDURE — 3077F SYST BP >= 140 MM HG: CPT | Performed by: FAMILY MEDICINE

## 2024-11-27 PROCEDURE — 99396 PREV VISIT EST AGE 40-64: CPT | Performed by: FAMILY MEDICINE

## 2024-11-27 PROCEDURE — 85027 COMPLETE CBC AUTOMATED: CPT

## 2024-11-27 PROCEDURE — 80061 LIPID PANEL: CPT

## 2024-11-27 RX ORDER — MODAFINIL 200 MG/1
200 TABLET ORAL DAILY
Qty: 90 TABLET | Refills: 1 | Status: SHIPPED | OUTPATIENT
Start: 2024-11-27 | End: 2025-05-26

## 2024-11-27 RX ORDER — SILDENAFIL 100 MG/1
100 TABLET, FILM COATED ORAL AS NEEDED
Qty: 30 TABLET | Refills: 5 | Status: SHIPPED | OUTPATIENT
Start: 2024-11-27 | End: 2025-11-27

## 2024-11-27 RX ORDER — ALBUTEROL SULFATE 90 UG/1
2 INHALANT RESPIRATORY (INHALATION) EVERY 4 HOURS PRN
Qty: 18 G | Refills: 0 | Status: SHIPPED | OUTPATIENT
Start: 2024-11-27

## 2024-11-27 RX ORDER — METHOCARBAMOL 500 MG/1
500-1000 TABLET, FILM COATED ORAL NIGHTLY PRN
Qty: 60 TABLET | Refills: 2 | Status: SHIPPED | OUTPATIENT
Start: 2024-11-27 | End: 2025-07-25

## 2024-11-27 ASSESSMENT — ENCOUNTER SYMPTOMS
ABDOMINAL PAIN: 0
PALPITATIONS: 0
EYE PAIN: 0
ADENOPATHY: 0
BLOOD IN STOOL: 0
EYE REDNESS: 0
WOUND: 0
FEVER: 0
SHORTNESS OF BREATH: 0
HEMATURIA: 0
RHINORRHEA: 0
BRUISES/BLEEDS EASILY: 0
HEADACHES: 0
HALLUCINATIONS: 0
CHILLS: 0
POLYDIPSIA: 0
FATIGUE: 0
DYSURIA: 0
WEAKNESS: 0
SORE THROAT: 0
BACK PAIN: 0
NECK STIFFNESS: 0
COUGH: 0

## 2024-11-27 ASSESSMENT — PATIENT HEALTH QUESTIONNAIRE - PHQ9
2. FEELING DOWN, DEPRESSED OR HOPELESS: NOT AT ALL
SUM OF ALL RESPONSES TO PHQ9 QUESTIONS 1 AND 2: 0
1. LITTLE INTEREST OR PLEASURE IN DOING THINGS: NOT AT ALL

## 2024-11-27 ASSESSMENT — PAIN SCALES - GENERAL: PAINLEVEL_OUTOF10: 0-NO PAIN

## 2024-11-27 NOTE — ASSESSMENT & PLAN NOTE
Continue max tolerated bp meds:     losartan to 100 mg daily.   Tolerated cut in verapamil from 180 to 120 ER. (chosen for treatment of cluster headaches).   Off plendil-- does not need duplicate     Runs in 130s at home.

## 2024-11-27 NOTE — PROGRESS NOTES
Jeff Rayo is a 64 y.o. male with Chief Complaint of Annual Exam (Pt has questions and concerns about back problems and lump on neck ).    Past Surgical History:   Procedure Laterality Date    COLONOSCOPY  05/07/2014    Complete Colonoscopy    ESOPHAGOGASTRODUODENOSCOPY  05/07/2014    Diagnostic Esophagogastroduodenoscopy    KNEE ARTHROSCOPY W/ DEBRIDEMENT  05/07/2014    Arthroscopy Knee Right    MR HEAD ANGIO WO IV CONTRAST  6/1/2014    MR HEAD ANGIO WO IV CONTRAST 6/1/2014 [facility]    MR HEAD ANGIO WO IV CONTRAST  6/1/2014    MR HEAD ANGIO WO IV CONTRAST 6/1/2014 [facility]    MR NECK ANGIO WO IV CONTRAST  6/1/2014    MR NECK ANGIO WO IV CONTRAST 6/1/2014 [facility]      Social History     Socioeconomic History    Marital status:      Spouse name: Not on file    Number of children: Not on file    Years of education: Not on file    Highest education level: Not on file   Occupational History    Not on file   Tobacco Use    Smoking status: Never     Passive exposure: Never    Smokeless tobacco: Never   Vaping Use    Vaping status: Never Used   Substance and Sexual Activity    Alcohol use: Yes    Drug use: Never    Sexual activity: Not on file   Other Topics Concern    Not on file   Social History Narrative    Not on file     Social Drivers of Health     Financial Resource Strain: Not on file   Food Insecurity: Not on file   Transportation Needs: Not on file   Physical Activity: Not on file   Stress: Not on file   Social Connections: Not on file   Intimate Partner Violence: Not on file   Housing Stability: Not on file     Past Medical History:   Diagnosis Date    Noninfective gastroenteritis and colitis, unspecified 08/17/2016    Colitis    Personal history of diseases of the skin and subcutaneous tissue 10/22/2014    History of urticaria    Personal history of diseases of the skin and subcutaneous tissue 06/03/2015    History of urticaria    Personal history of other  diseases of the digestive system 10/21/2019    History of dental abscess    Personal history of other diseases of the nervous system and sense organs 05/03/2021    History of cluster headache    Personal history of other diseases of the respiratory system 03/09/2016    History of sinusitis    Unspecified symptoms and signs involving the genitourinary system 09/27/2019    UTI symptoms      No family history on file.     Review of Systems   Constitutional:  Negative for chills, fatigue and fever.   HENT:  Negative for rhinorrhea and sore throat.    Eyes:  Negative for pain and redness.   Respiratory:  Negative for cough and shortness of breath.    Cardiovascular:  Negative for chest pain and palpitations.   Gastrointestinal:  Negative for abdominal pain and blood in stool.   Endocrine: Negative for polydipsia and polyuria.   Genitourinary:  Negative for dysuria and hematuria.   Musculoskeletal:  Negative for back pain and neck stiffness.   Skin:  Negative for rash and wound.   Allergic/Immunologic: Negative for environmental allergies and food allergies.   Neurological:  Negative for weakness and headaches.   Hematological:  Negative for adenopathy. Does not bruise/bleed easily.   Psychiatric/Behavioral:  Negative for hallucinations and suicidal ideas.       BP (!) 140/100   Pulse 66   Temp 36.4 °C (97.5 °F)   Wt 88 kg (194 lb)   SpO2 95%   BMI 29.50 kg/m²   Physical Exam  Vitals reviewed.   Constitutional:       General: He is not in acute distress.     Appearance: He is not ill-appearing.   HENT:      Head: Normocephalic and atraumatic.      Right Ear: Tympanic membrane normal.      Left Ear: Tympanic membrane normal.      Nose: No congestion or rhinorrhea.      Mouth/Throat:      Pharynx: No oropharyngeal exudate or posterior oropharyngeal erythema.   Eyes:      Extraocular Movements: Extraocular movements intact.      Conjunctiva/sclera: Conjunctivae normal.      Pupils: Pupils are equal, round, and reactive  "to light.   Cardiovascular:      Rate and Rhythm: Normal rate and regular rhythm.      Heart sounds: No murmur heard.     No friction rub. No gallop.   Pulmonary:      Effort: Pulmonary effort is normal.      Breath sounds: Normal breath sounds. No wheezing, rhonchi or rales.   Abdominal:      General: There is no distension.      Palpations: Abdomen is soft.      Tenderness: There is no abdominal tenderness. There is no guarding or rebound.   Musculoskeletal:         General: No swelling or deformity.      Cervical back: Normal range of motion and neck supple.      Right lower leg: No edema.      Left lower leg: No edema.   Skin:     Capillary Refill: Capillary refill takes less than 2 seconds.      Coloration: Skin is not jaundiced.      Findings: No rash.   Neurological:      General: No focal deficit present.      Mental Status: He is alert.      Motor: No weakness.   Psychiatric:         Mood and Affect: Mood normal.         Behavior: Behavior normal.       Lab Results   Component Value Date    WBC 5.7 11/22/2023    HGB 14.7 11/22/2023    HCT 42.4 11/22/2023    MCV 89 11/22/2023     11/22/2023     Lab Results   Component Value Date    CHOL 237 (H) 11/22/2023    CHOL 197 07/13/2022    CHOL 197 11/04/2021     Lab Results   Component Value Date    HDL 98.0 11/22/2023    HDL 86.1 07/13/2022    HDL 76.6 11/04/2021     Lab Results   Component Value Date    LDLCALC 124 (H) 11/22/2023     Lab Results   Component Value Date    TRIG 75 11/22/2023    TRIG 50 07/13/2022    TRIG 54 11/04/2021     No components found for: \"CHOLHDL\"  No results found for: \"HGBA1C\"    Assessment/Plan   Problem List Items Addressed This Visit       ED (erectile dysfunction)    Current Assessment & Plan     Prn sildenafil.         Relevant Medications    sildenafil (Viagra) 100 mg tablet    GERD (gastroesophageal reflux disease)    Overview     9/9/16 EGD -- gastritis, path NEG barretts.          Current Assessment & Plan     Prn otc " nexium         Hyperlipidemia    Overview     11/10/21 CACS Zero  11/2023 MERRITT 2%         Current Assessment & Plan     No statin needed at this time.  Continue to work on weight loss and exercise.          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   OARRS reviewed 11/27/24  Tox screen clean 5/4/22 sent 11/27/24    Referred to Dr ANABELL Verma to consider Inspire device.          Well adult exam    Overview     11/22/23 Preventative Visit -- declines flu shot and tdap next due 2029. Counseled on healthy diet and exercise.     Colonoscopy (hyperplastic polyp) 2016 -- next due 2026.  Retired at age 57 -- staying part time at [employer], and working at [employer].     Healthful alcohol intake 2 or fewer beers a day.     2/2/2013 ct chest -- Stable pulmonary nodules. 3 mm nodule in rul lateral; 4 mm rul anterior. RLL calcified granuloma. Stable from 2012.     11/10/2021 CACS ZERO!!! no need for statin. MP          Relevant Orders    Comprehensive metabolic panel    CBC    Lipid panel    RESOLVED: Enlarged prostate with lower urinary tract symptoms (LUTS)     Other Visit Diagnoses       Wheeze    -  Primary    Relevant Medications    albuterol 90 mcg/actuation inhaler    Screening for prostate cancer        Relevant Orders    Prostate Specific Antigen, Screen    Drug therapy        Spasm of right trapezius muscle        Relevant Medications    methocarbamol (Robaxin) 500 mg tablet

## 2024-11-27 NOTE — ASSESSMENT & PLAN NOTE
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 6/5/24  OARRS reviewed 11/27/24  Tox screen clean 5/4/22 sent 11/27/24    Referred to Dr ANABELL Verma to consider Inspire device.

## 2024-11-28 LAB — PSA SERPL-MCNC: 0.55 NG/ML

## 2025-04-22 DIAGNOSIS — I10 PRIMARY HYPERTENSION: ICD-10-CM

## 2025-04-22 RX ORDER — LOSARTAN POTASSIUM 100 MG/1
100 TABLET ORAL DAILY
Qty: 90 TABLET | Refills: 3 | Status: SHIPPED | OUTPATIENT
Start: 2025-04-22

## 2025-04-22 RX ORDER — HYDROCHLOROTHIAZIDE 25 MG/1
120 TABLET ORAL NIGHTLY
Qty: 90 TABLET | Refills: 3 | Status: SHIPPED | OUTPATIENT
Start: 2025-04-22

## 2025-04-27 DIAGNOSIS — F41.9 ANXIETY: ICD-10-CM

## 2025-04-28 RX ORDER — ESCITALOPRAM OXALATE 20 MG/1
40 TABLET ORAL DAILY
Qty: 180 TABLET | Refills: 3 | Status: SHIPPED | OUTPATIENT
Start: 2025-04-28

## 2025-06-03 ENCOUNTER — APPOINTMENT (OUTPATIENT)
Dept: PRIMARY CARE | Facility: CLINIC | Age: 65
End: 2025-06-03
Payer: COMMERCIAL

## 2025-07-18 ENCOUNTER — APPOINTMENT (OUTPATIENT)
Dept: PRIMARY CARE | Facility: CLINIC | Age: 65
End: 2025-07-18
Payer: COMMERCIAL

## 2025-07-18 VITALS
TEMPERATURE: 98 F | WEIGHT: 196 LBS | BODY MASS INDEX: 29.7 KG/M2 | HEIGHT: 68 IN | SYSTOLIC BLOOD PRESSURE: 146 MMHG | OXYGEN SATURATION: 95 % | HEART RATE: 63 BPM | DIASTOLIC BLOOD PRESSURE: 94 MMHG

## 2025-07-18 DIAGNOSIS — G44.019 EPISODIC CLUSTER HEADACHE, NOT INTRACTABLE: ICD-10-CM

## 2025-07-18 DIAGNOSIS — Z79.899 DRUG THERAPY: ICD-10-CM

## 2025-07-18 DIAGNOSIS — I10 PRIMARY HYPERTENSION: Primary | ICD-10-CM

## 2025-07-18 DIAGNOSIS — K58.1 IRRITABLE BOWEL SYNDROME WITH CONSTIPATION: ICD-10-CM

## 2025-07-18 DIAGNOSIS — F41.9 ANXIETY: ICD-10-CM

## 2025-07-18 DIAGNOSIS — N52.9 ERECTILE DYSFUNCTION, UNSPECIFIED ERECTILE DYSFUNCTION TYPE: ICD-10-CM

## 2025-07-18 DIAGNOSIS — G47.19 EXCESSIVE DAYTIME SLEEPINESS: ICD-10-CM

## 2025-07-18 DIAGNOSIS — Z00.00 WELL ADULT EXAM: ICD-10-CM

## 2025-07-18 DIAGNOSIS — K21.9 GASTROESOPHAGEAL REFLUX DISEASE WITHOUT ESOPHAGITIS: ICD-10-CM

## 2025-07-18 PROCEDURE — 1036F TOBACCO NON-USER: CPT | Performed by: FAMILY MEDICINE

## 2025-07-18 PROCEDURE — 3077F SYST BP >= 140 MM HG: CPT | Performed by: FAMILY MEDICINE

## 2025-07-18 PROCEDURE — 3080F DIAST BP >= 90 MM HG: CPT | Performed by: FAMILY MEDICINE

## 2025-07-18 PROCEDURE — 99396 PREV VISIT EST AGE 40-64: CPT | Performed by: FAMILY MEDICINE

## 2025-07-18 PROCEDURE — 3008F BODY MASS INDEX DOCD: CPT | Performed by: FAMILY MEDICINE

## 2025-07-18 RX ORDER — OMEPRAZOLE 40 MG/1
40 CAPSULE, DELAYED RELEASE ORAL
Qty: 30 CAPSULE | Refills: 11 | Status: SHIPPED | OUTPATIENT
Start: 2025-07-18 | End: 2026-07-18

## 2025-07-18 RX ORDER — RIZATRIPTAN BENZOATE 10 MG/1
10 TABLET ORAL ONCE AS NEEDED
Qty: 9 TABLET | Refills: 11 | Status: SHIPPED | OUTPATIENT
Start: 2025-07-18 | End: 2026-07-18

## 2025-07-18 RX ORDER — BUSPIRONE HYDROCHLORIDE 5 MG/1
5 TABLET ORAL 2 TIMES DAILY
Qty: 60 TABLET | Refills: 11 | Status: SHIPPED | OUTPATIENT
Start: 2025-07-18 | End: 2026-07-18

## 2025-07-18 RX ORDER — MODAFINIL 200 MG/1
200 TABLET ORAL DAILY
Qty: 90 TABLET | Refills: 1 | Status: SHIPPED | OUTPATIENT
Start: 2025-07-18 | End: 2026-01-14

## 2025-07-18 RX ORDER — ESCITALOPRAM OXALATE 10 MG/1
TABLET ORAL
Qty: 42 TABLET | Refills: 0 | Status: SHIPPED | OUTPATIENT
Start: 2025-07-18 | End: 2025-08-15

## 2025-07-18 ASSESSMENT — ENCOUNTER SYMPTOMS
FEVER: 0
BACK PAIN: 0
WOUND: 0
COUGH: 0
BLOOD IN STOOL: 0
FATIGUE: 0
RHINORRHEA: 0
WEAKNESS: 0
POLYDIPSIA: 0
EYE PAIN: 0
HEMATURIA: 0
HEADACHES: 0
DYSURIA: 0
SHORTNESS OF BREATH: 0
CHILLS: 0
ABDOMINAL PAIN: 0
BRUISES/BLEEDS EASILY: 0
EYE REDNESS: 0
PALPITATIONS: 0
HALLUCINATIONS: 0
NECK STIFFNESS: 0
SORE THROAT: 0
ADENOPATHY: 0

## 2025-07-18 NOTE — ASSESSMENT & PLAN NOTE
No longer controlled with Prn otc nexium  Start omeprazole 40 mg daily for 28 days and as needed.

## 2025-07-18 NOTE — PATIENT INSTRUCTIONS
Wean off lexapro -- cut to 1 tablet (20 mg) nightly x 2 weeks, then 1/2 tablet (10 mg) nightly x 2 weeks.

## 2025-07-18 NOTE — ASSESSMENT & PLAN NOTE
Up despite max tolerated losartan and CCB.  Will recheck in January after trial of buspar for anxiety.     Continue max tolerated bp meds:     Tolerating losartan to 100 mg daily.   BP up since cut in verapamil from 180 to 120 ER. (chosen for treatment of cluster headaches).   Off plendil (felodipine) -- does not need duplicate CCB.

## 2025-07-18 NOTE — PROGRESS NOTES
Jeff Rayo is a 65 y.o. male with Chief Complaint of annual preventive exam.  Last seen 11/2024.  And annual recheck on HTN, HLD,     OARRS:  Mil Steele MD on 7/18/2025  9:17 AM  I have personally reviewed the OARRS report for Jeff Rayo. I have considered the risks of abuse, dependence, addiction and diversion and I believe that it is clinically appropriate for Jeff Rayo to be prescribed this medication    Is the patient prescribed a combination of a benzodiazepine and opioid?  No  Remote valium for dental work only.     Last Urine Drug Screen / ordered today: Yes  No results found for this or any previous visit (from the past 8760 hours).  Results are as expected.     Clinical rationale for not completing a Urine Drug Screen: sent 7/18/25      Controlled Substance Agreement:  Date of the Last Agreement: reviewed 7/18/25  Reviewed Controlled Substance Agreement including but not limited to the benefits, risks, and alternatives to treatment with a Controlled Substance medication(s).    Stimulants:   What is the patient's goal of therapy? Help with alertness and concentration  Is this being achieved with current treatment? yes    Activities of Daily Living:   Is your overall impression that this patient is benefiting (symptom reduction outweighs side effects) from stimulant therapy? Yes     1. Physical Functioning: Same  2. Family Relationship: Same  3. Social Relationship: Same  4. Mood: Same  5. Sleep Patterns: Same  6. Overall Function: Better      Past Surgical History:   Procedure Laterality Date    COLONOSCOPY  05/07/2014    Complete Colonoscopy    ESOPHAGOGASTRODUODENOSCOPY  05/07/2014    Diagnostic Esophagogastroduodenoscopy    KNEE ARTHROSCOPY W/ DEBRIDEMENT  05/07/2014    Arthroscopy Knee Right    MR HEAD ANGIO WO IV CONTRAST  6/1/2014    MR HEAD ANGIO WO IV CONTRAST 6/1/2014 [facility]GENCY LEGACY    MR HEAD ANGIO WO IV CONTRAST  6/1/2014    MR HEAD ANGIO WO IV CONTRAST  6/1/2014 [facility]    MR NECK ANGIO WO IV CONTRAST  6/1/2014    MR NECK ANGIO WO IV CONTRAST 6/1/2014 [facility] EMERGENCY LEGACY      Social History     Socioeconomic History    Marital status:      Spouse name: Not on file    Number of children: Not on file    Years of education: Not on file    Highest education level: Not on file   Occupational History    Not on file   Tobacco Use    Smoking status: Never     Passive exposure: Never    Smokeless tobacco: Never   Vaping Use    Vaping status: Never Used   Substance and Sexual Activity    Alcohol use: Not Currently     Comment: n/a    Drug use: Never    Sexual activity: Yes     Partners: Female     Birth control/protection: None     Comment: n/a   Other Topics Concern    Not on file   Social History Narrative    Not on file     Social Drivers of Health     Financial Resource Strain: Not on file   Food Insecurity: Not on file   Transportation Needs: Not on file   Physical Activity: Not on file   Stress: Not on file   Social Connections: Not on file   Intimate Partner Violence: Not on file   Housing Stability: Not on file     Past Medical History:   Diagnosis Date    Noninfective gastroenteritis and colitis, unspecified 08/17/2016    Colitis    Personal history of diseases of the skin and subcutaneous tissue 10/22/2014    History of urticaria    Personal history of diseases of the skin and subcutaneous tissue 06/03/2015    History of urticaria    Personal history of other diseases of the digestive system 10/21/2019    History of dental abscess    Personal history of other diseases of the nervous system and sense organs 05/03/2021    History of cluster headache    Personal history of other diseases of the respiratory system 03/09/2016    History of sinusitis    Unspecified symptoms and signs involving the genitourinary system 09/27/2019    UTI symptoms      Family History   Problem Relation Name Age of Onset    Other (old age) Mother      Other (old age)  "Father          Review of Systems   Constitutional:  Negative for chills, fatigue and fever.   HENT:  Negative for rhinorrhea and sore throat.    Eyes:  Negative for pain and redness.   Respiratory:  Negative for cough and shortness of breath.    Cardiovascular:  Negative for chest pain and palpitations.   Gastrointestinal:  Negative for abdominal pain and blood in stool.   Endocrine: Negative for polydipsia and polyuria.   Genitourinary:  Negative for dysuria and hematuria.   Musculoskeletal:  Negative for back pain and neck stiffness.   Skin:  Negative for rash and wound.   Allergic/Immunologic: Negative for environmental allergies and food allergies.   Neurological:  Negative for weakness and headaches.   Hematological:  Negative for adenopathy. Does not bruise/bleed easily.   Psychiatric/Behavioral:  Negative for hallucinations and suicidal ideas.       BP (!) 146/94   Pulse 63   Temp 36.7 °C (98 °F)   Ht 1.727 m (5' 8\")   Wt 88.9 kg (196 lb)   SpO2 95%   BMI 29.80 kg/m²   Physical Exam  Vitals reviewed.   Constitutional:       General: He is not in acute distress.     Appearance: He is not ill-appearing.   HENT:      Head: Normocephalic and atraumatic.      Right Ear: Tympanic membrane normal.      Left Ear: Tympanic membrane normal.      Nose: No congestion or rhinorrhea.      Mouth/Throat:      Pharynx: No oropharyngeal exudate or posterior oropharyngeal erythema.     Eyes:      Extraocular Movements: Extraocular movements intact.      Conjunctiva/sclera: Conjunctivae normal.      Pupils: Pupils are equal, round, and reactive to light.       Cardiovascular:      Rate and Rhythm: Normal rate and regular rhythm.      Heart sounds: No murmur heard.     No friction rub. No gallop.   Pulmonary:      Effort: Pulmonary effort is normal.      Breath sounds: Normal breath sounds. No wheezing, rhonchi or rales.   Abdominal:      General: There is no distension.      Palpations: Abdomen is soft.      Tenderness: " "There is no abdominal tenderness. There is no guarding or rebound.     Musculoskeletal:         General: No swelling or deformity.      Cervical back: Normal range of motion and neck supple.      Right lower leg: No edema.      Left lower leg: No edema.     Skin:     Capillary Refill: Capillary refill takes less than 2 seconds.      Coloration: Skin is not jaundiced.      Findings: No rash.     Neurological:      General: No focal deficit present.      Mental Status: He is alert.      Motor: No weakness.     Psychiatric:         Mood and Affect: Mood normal.         Behavior: Behavior normal.       Lab Results   Component Value Date    WBC 5.9 11/27/2024    HGB 15.7 11/27/2024    HCT 47.0 11/27/2024    MCV 91 11/27/2024     11/27/2024     Lab Results   Component Value Date    CHOL 226 (H) 11/27/2024    CHOL 237 (H) 11/22/2023    CHOL 197 07/13/2022     Lab Results   Component Value Date    HDL 88.7 11/27/2024    HDL 98.0 11/22/2023    HDL 86.1 07/13/2022     Lab Results   Component Value Date    LDLCALC 127 (H) 11/27/2024    LDLCALC 124 (H) 11/22/2023     Lab Results   Component Value Date    TRIG 50 11/27/2024    TRIG 75 11/22/2023    TRIG 50 07/13/2022     No components found for: \"CHOLHDL\"  No results found for: \"HGBA1C\"    Assessment/Plan   Problem List Items Addressed This Visit       Anxiety    Overview   Did not tolerate prozac, welbutrin, effexor in the past.   Sexual side effects with lexapro.          Current Assessment & Plan   shock of his wife leaving 6/2020    Wean off lexapro -- cut to 1 tablet (20 mg) nightly x 2 weeks, then 1/2 tablet (10 mg) nightly x 2 weeks.    Trial Buspar 5 mg bid.             Relevant Medications    busPIRone (Buspar) 5 mg tablet    escitalopram (Lexapro) 10 mg tablet    ED (erectile dysfunction)    Current Assessment & Plan   Prn sildenafil.         Excessive daytime sleepiness    Current Assessment & Plan   Refill modafanil 200 mg as needed when needs to focus on part " time job.   Send for tox screen 7/18/25  Reviewed CSI 7/18/25  OARRS reviewed 7/18/25         Relevant Medications    modafinil (Provigil) 200 mg tablet    GERD (gastroesophageal reflux disease)    Overview   9/9/16 EGD -- gastritis, path NEG barretts.          Current Assessment & Plan   No longer controlled with Prn otc nexium  Start omeprazole 40 mg daily for 28 days and as needed.          Relevant Medications    omeprazole (PriLOSEC) 40 mg DR capsule    busPIRone (Buspar) 5 mg tablet    Hypertension - Primary    Overview   Choice of verapamil for control of cluster headaches.          Current Assessment & Plan   Up despite max tolerated losartan and CCB.  Will recheck in January after trial of buspar for anxiety.     Continue max tolerated bp meds:     Tolerating losartan to 100 mg daily.   BP up since cut in verapamil from 180 to 120 ER. (chosen for treatment of cluster headaches).   Off plendil (felodipine) -- does not need duplicate CCB.         IBS (irritable bowel syndrome)    Overview   IBS-D         Current Assessment & Plan   uses otc anti-diarrheal prn   Improved with cutting back on energy drinks.   Already avoiding dairy.     Tried pantoprazole from Dr Kate 2016 but this caused worsening diarrhea.     Did not tolerate lomotil in the past (2020)         Well adult exam    Overview   7/18/25 Preventative Visit -- declines flu shot and tdap next due 2029. Counseled on healthy diet and exercise.     Monitor labs.     Colonoscopy (hyperplastic polyp) 2016 -- next due 2026.  Retired at age 57 -- staying part time at [employer], and working at [employer].     Healthful alcohol intake 2 or fewer beers a day.     2/2/2013 ct chest -- Stable pulmonary nodules. 3 mm nodule in rul lateral; 4 mm rul anterior. RLL calcified granuloma. Stable from 2012.     11/10/2021 CACS ZERO!!! no need for statin. MP   Recheck 2026.         Relevant Orders    Comprehensive metabolic panel    CBC    Lipid  panel    Episodic cluster headache, not intractable    Current Assessment & Plan   Stable on verapamil for prevention.  Prn ibuprofen and rizatriptan         Relevant Medications    rizatriptan (Maxalt) 10 mg tablet     Other Visit Diagnoses         Drug therapy        Relevant Orders    DRUG SCREEN,URINE

## 2025-07-18 NOTE — ASSESSMENT & PLAN NOTE
Refill modafanil 200 mg as needed when needs to focus on part time job.   Send for tox screen 7/18/25  Reviewed CSI 7/18/25  OARRS reviewed 7/18/25

## 2025-07-18 NOTE — ASSESSMENT & PLAN NOTE
shock of his wife leaving 6/2020    Wean off lexapro -- cut to 1 tablet (20 mg) nightly x 2 weeks, then 1/2 tablet (10 mg) nightly x 2 weeks.    Trial Buspar 5 mg bid.

## 2025-07-19 LAB
ALBUMIN SERPL-MCNC: 4.5 G/DL (ref 3.6–5.1)
ALP SERPL-CCNC: 88 U/L (ref 35–144)
ALT SERPL-CCNC: 29 U/L (ref 9–46)
AMPHETAMINES UR QL: NEGATIVE NG/ML
ANION GAP SERPL CALCULATED.4IONS-SCNC: 8 MMOL/L (CALC) (ref 7–17)
AST SERPL-CCNC: 22 U/L (ref 10–35)
BARBITURATES UR QL: NEGATIVE NG/ML
BENZODIAZ UR QL: NEGATIVE NG/ML
BILIRUB SERPL-MCNC: 0.6 MG/DL (ref 0.2–1.2)
BUN SERPL-MCNC: 16 MG/DL (ref 7–25)
BZE UR QL: NEGATIVE NG/ML
CALCIUM SERPL-MCNC: 9 MG/DL (ref 8.6–10.3)
CHLORIDE SERPL-SCNC: 105 MMOL/L (ref 98–110)
CHOLEST SERPL-MCNC: 215 MG/DL
CHOLEST/HDLC SERPL: 2.2 (CALC)
CO2 SERPL-SCNC: 28 MMOL/L (ref 20–32)
CREAT SERPL-MCNC: 0.86 MG/DL (ref 0.7–1.35)
CREAT UR-MCNC: 117.9 MG/DL
EGFRCR SERPLBLD CKD-EPI 2021: 97 ML/MIN/1.73M2
ERYTHROCYTE [DISTWIDTH] IN BLOOD BY AUTOMATED COUNT: 12.5 % (ref 11–15)
FENTANYL UR QL SCN: NEGATIVE NG/ML
GLUCOSE SERPL-MCNC: 106 MG/DL (ref 65–99)
HCT VFR BLD AUTO: 45 % (ref 38.5–50)
HDLC SERPL-MCNC: 96 MG/DL
HGB BLD-MCNC: 15.1 G/DL (ref 13.2–17.1)
LDLC SERPL CALC-MCNC: 107 MG/DL (CALC)
MCH RBC QN AUTO: 31.5 PG (ref 27–33)
MCHC RBC AUTO-ENTMCNC: 33.6 G/DL (ref 32–36)
MCV RBC AUTO: 93.8 FL (ref 80–100)
METHADONE UR QL: NEGATIVE NG/ML
NONHDLC SERPL-MCNC: 119 MG/DL (CALC)
OPIATES UR QL: NEGATIVE NG/ML
OXIDANTS UR QL: NEGATIVE MCG/ML
OXYCODONE UR QL: NEGATIVE NG/ML
PH UR: 5.4 [PH] (ref 4.5–9)
PLATELET # BLD AUTO: 227 THOUSAND/UL (ref 140–400)
PMV BLD REES-ECKER: 9.9 FL (ref 7.5–12.5)
POTASSIUM SERPL-SCNC: 4.4 MMOL/L (ref 3.5–5.3)
PROT SERPL-MCNC: 6.4 G/DL (ref 6.1–8.1)
QUEST NOTES AND COMMENTS: NORMAL
RBC # BLD AUTO: 4.8 MILLION/UL (ref 4.2–5.8)
SODIUM SERPL-SCNC: 141 MMOL/L (ref 135–146)
THC UR QL: NEGATIVE NG/ML
TRIGL SERPL-MCNC: 41 MG/DL
WBC # BLD AUTO: 4.8 THOUSAND/UL (ref 3.8–10.8)

## 2025-07-28 ENCOUNTER — TELEPHONE (OUTPATIENT)
Dept: PRIMARY CARE | Facility: CLINIC | Age: 65
End: 2025-07-28
Payer: COMMERCIAL

## 2025-07-28 DIAGNOSIS — I10 PRIMARY HYPERTENSION: ICD-10-CM

## 2025-07-28 RX ORDER — VERAPAMIL HYDROCHLORIDE 180 MG/1
180 TABLET, EXTENDED RELEASE ORAL NIGHTLY
Qty: 90 TABLET | Refills: 3 | Status: SHIPPED | OUTPATIENT
Start: 2025-07-28 | End: 2026-07-28

## 2025-07-28 NOTE — TELEPHONE ENCOUNTER
Patient called and states his bp has been running high since his visit on 7/18. Recent reading have been 198/105, this am resting is 190/118.

## 2025-07-28 NOTE — TELEPHONE ENCOUNTER
MD in office was asked since number was high. Sent message in epic and text to pcp as well. Unfortunately, the office is short staffed and we do the best we can to make sure urgent issues are addressed.

## 2025-08-02 ENCOUNTER — APPOINTMENT (OUTPATIENT)
Dept: CARDIOLOGY | Facility: HOSPITAL | Age: 65
End: 2025-08-02
Payer: COMMERCIAL

## 2025-08-02 ENCOUNTER — APPOINTMENT (OUTPATIENT)
Dept: RADIOLOGY | Facility: HOSPITAL | Age: 65
End: 2025-08-02
Payer: COMMERCIAL

## 2025-08-02 ENCOUNTER — HOSPITAL ENCOUNTER (OUTPATIENT)
Facility: HOSPITAL | Age: 65
Setting detail: OBSERVATION
Discharge: HOME | End: 2025-08-03
Attending: EMERGENCY MEDICINE | Admitting: INTERNAL MEDICINE
Payer: COMMERCIAL

## 2025-08-02 DIAGNOSIS — I16.1 HYPERTENSIVE EMERGENCY: Primary | ICD-10-CM

## 2025-08-02 DIAGNOSIS — K21.9 GASTROESOPHAGEAL REFLUX DISEASE WITHOUT ESOPHAGITIS: ICD-10-CM

## 2025-08-02 PROBLEM — G44.009 CLUSTER HEADACHES: Status: ACTIVE | Noted: 2023-05-06

## 2025-08-02 PROBLEM — R06.2 WHEEZING: Status: RESOLVED | Noted: 2025-08-02 | Resolved: 2025-08-02

## 2025-08-02 PROBLEM — K76.0 FATTY LIVER: Status: ACTIVE | Noted: 2022-07-14

## 2025-08-02 PROBLEM — M54.50 LOW BACK PAIN, UNSPECIFIED: Status: RESOLVED | Noted: 2024-12-11 | Resolved: 2025-08-02

## 2025-08-02 PROBLEM — K04.7 DENTAL ABSCESS: Status: RESOLVED | Noted: 2025-08-02 | Resolved: 2025-08-02

## 2025-08-02 PROBLEM — R05.9 COUGH: Status: RESOLVED | Noted: 2021-12-01 | Resolved: 2025-08-02

## 2025-08-02 PROBLEM — U09.9 CHRONIC POST-COVID-19 SYNDROME: Status: RESOLVED | Noted: 2025-08-02 | Resolved: 2025-08-02

## 2025-08-02 PROBLEM — T78.3XXA ANGIOEDEMA: Status: RESOLVED | Noted: 2025-08-02 | Resolved: 2025-08-02

## 2025-08-02 PROBLEM — I67.4 HYPERTENSIVE ENCEPHALOPATHY: Status: ACTIVE | Noted: 2025-08-02

## 2025-08-02 PROBLEM — R42 DIZZINESS AND GIDDINESS: Status: ACTIVE | Noted: 2022-07-14

## 2025-08-02 PROBLEM — E66.3 OVERWEIGHT WITH BODY MASS INDEX (BMI) 25.0-29.9: Status: ACTIVE | Noted: 2025-08-02

## 2025-08-02 PROBLEM — M25.569 KNEE PAIN: Status: RESOLVED | Noted: 2025-08-02 | Resolved: 2025-08-02

## 2025-08-02 PROBLEM — Z88.0 HISTORY OF PENICILLIN ALLERGY: Status: ACTIVE | Noted: 2022-07-14

## 2025-08-02 PROBLEM — K52.9 COLITIS: Status: RESOLVED | Noted: 2025-08-02 | Resolved: 2025-08-02

## 2025-08-02 PROBLEM — G47.33 OBSTRUCTIVE SLEEP APNEA SYNDROME: Status: ACTIVE | Noted: 2022-07-14

## 2025-08-02 PROBLEM — N40.0 BENIGN PROSTATIC HYPERPLASIA: Status: ACTIVE | Noted: 2022-07-14

## 2025-08-02 PROBLEM — R11.0 NAUSEA: Status: ACTIVE | Noted: 2022-07-14

## 2025-08-02 PROBLEM — R40.0 DAYTIME SOMNOLENCE: Status: ACTIVE | Noted: 2023-03-31

## 2025-08-02 LAB
ALBUMIN SERPL BCP-MCNC: 4.7 G/DL (ref 3.4–5)
ALP SERPL-CCNC: 85 U/L (ref 33–136)
ALT SERPL W P-5'-P-CCNC: 27 U/L (ref 10–52)
ANION GAP SERPL CALC-SCNC: 12 MMOL/L (ref 10–20)
APTT PPP: 30 SECONDS (ref 26–36)
AST SERPL W P-5'-P-CCNC: 19 U/L (ref 9–39)
BASOPHILS # BLD AUTO: 0.02 X10*3/UL (ref 0–0.1)
BASOPHILS NFR BLD AUTO: 0.4 %
BILIRUB SERPL-MCNC: 0.6 MG/DL (ref 0–1.2)
BUN SERPL-MCNC: 17 MG/DL (ref 6–23)
CALCIUM SERPL-MCNC: 9 MG/DL (ref 8.6–10.3)
CARDIAC TROPONIN I PNL SERPL HS: 5 NG/L (ref 0–20)
CHLORIDE SERPL-SCNC: 103 MMOL/L (ref 98–107)
CO2 SERPL-SCNC: 29 MMOL/L (ref 21–32)
CREAT SERPL-MCNC: 0.89 MG/DL (ref 0.5–1.3)
EGFRCR SERPLBLD CKD-EPI 2021: >90 ML/MIN/1.73M*2
EOSINOPHIL # BLD AUTO: 0.09 X10*3/UL (ref 0–0.7)
EOSINOPHIL NFR BLD AUTO: 1.9 %
ERYTHROCYTE [DISTWIDTH] IN BLOOD BY AUTOMATED COUNT: 12.8 % (ref 11.5–14.5)
GLUCOSE BLD MANUAL STRIP-MCNC: 98 MG/DL (ref 74–99)
GLUCOSE SERPL-MCNC: 93 MG/DL (ref 74–99)
HCT VFR BLD AUTO: 42.8 % (ref 41–52)
HGB BLD-MCNC: 15.3 G/DL (ref 13.5–17.5)
IMM GRANULOCYTES # BLD AUTO: 0.02 X10*3/UL (ref 0–0.7)
IMM GRANULOCYTES NFR BLD AUTO: 0.4 % (ref 0–0.9)
INR PPP: 0.9 (ref 0.9–1.1)
LYMPHOCYTES # BLD AUTO: 1.4 X10*3/UL (ref 1.2–4.8)
LYMPHOCYTES NFR BLD AUTO: 30 %
MCH RBC QN AUTO: 31.6 PG (ref 26–34)
MCHC RBC AUTO-ENTMCNC: 35.7 G/DL (ref 32–36)
MCV RBC AUTO: 88 FL (ref 80–100)
MONOCYTES # BLD AUTO: 0.36 X10*3/UL (ref 0.1–1)
MONOCYTES NFR BLD AUTO: 7.7 %
NEUTROPHILS # BLD AUTO: 2.77 X10*3/UL (ref 1.2–7.7)
NEUTROPHILS NFR BLD AUTO: 59.6 %
NRBC BLD-RTO: 0 /100 WBCS (ref 0–0)
PLATELET # BLD AUTO: 200 X10*3/UL (ref 150–450)
POTASSIUM SERPL-SCNC: 3.8 MMOL/L (ref 3.5–5.3)
PROT SERPL-MCNC: 6.7 G/DL (ref 6.4–8.2)
PROTHROMBIN TIME: 10.3 SECONDS (ref 9.8–12.4)
RBC # BLD AUTO: 4.84 X10*6/UL (ref 4.5–5.9)
SODIUM SERPL-SCNC: 140 MMOL/L (ref 136–145)
WBC # BLD AUTO: 4.7 X10*3/UL (ref 4.4–11.3)

## 2025-08-02 PROCEDURE — 93005 ELECTROCARDIOGRAM TRACING: CPT

## 2025-08-02 PROCEDURE — 85730 THROMBOPLASTIN TIME PARTIAL: CPT | Performed by: EMERGENCY MEDICINE

## 2025-08-02 PROCEDURE — 2500000004 HC RX 250 GENERAL PHARMACY W/ HCPCS (ALT 636 FOR OP/ED)

## 2025-08-02 PROCEDURE — 70551 MRI BRAIN STEM W/O DYE: CPT

## 2025-08-02 PROCEDURE — 96372 THER/PROPH/DIAG INJ SC/IM: CPT

## 2025-08-02 PROCEDURE — 2500000001 HC RX 250 WO HCPCS SELF ADMINISTERED DRUGS (ALT 637 FOR MEDICARE OP)

## 2025-08-02 PROCEDURE — 84484 ASSAY OF TROPONIN QUANT: CPT | Performed by: EMERGENCY MEDICINE

## 2025-08-02 PROCEDURE — G0378 HOSPITAL OBSERVATION PER HR: HCPCS

## 2025-08-02 PROCEDURE — G0426 INPT/ED TELECONSULT50: HCPCS | Performed by: STUDENT IN AN ORGANIZED HEALTH CARE EDUCATION/TRAINING PROGRAM

## 2025-08-02 PROCEDURE — 99291 CRITICAL CARE FIRST HOUR: CPT | Mod: 25 | Performed by: EMERGENCY MEDICINE

## 2025-08-02 PROCEDURE — 36415 COLL VENOUS BLD VENIPUNCTURE: CPT | Performed by: EMERGENCY MEDICINE

## 2025-08-02 PROCEDURE — 85025 COMPLETE CBC W/AUTO DIFF WBC: CPT | Performed by: EMERGENCY MEDICINE

## 2025-08-02 PROCEDURE — 70551 MRI BRAIN STEM W/O DYE: CPT | Performed by: RADIOLOGY

## 2025-08-02 PROCEDURE — 99223 1ST HOSP IP/OBS HIGH 75: CPT

## 2025-08-02 PROCEDURE — 70450 CT HEAD/BRAIN W/O DYE: CPT

## 2025-08-02 PROCEDURE — 83036 HEMOGLOBIN GLYCOSYLATED A1C: CPT | Mod: GEALAB

## 2025-08-02 PROCEDURE — 85610 PROTHROMBIN TIME: CPT | Performed by: EMERGENCY MEDICINE

## 2025-08-02 PROCEDURE — 2500000005 HC RX 250 GENERAL PHARMACY W/O HCPCS

## 2025-08-02 PROCEDURE — 82947 ASSAY GLUCOSE BLOOD QUANT: CPT | Mod: 59

## 2025-08-02 PROCEDURE — 80053 COMPREHEN METABOLIC PANEL: CPT | Performed by: EMERGENCY MEDICINE

## 2025-08-02 PROCEDURE — 96374 THER/PROPH/DIAG INJ IV PUSH: CPT | Mod: 59

## 2025-08-02 PROCEDURE — 70450 CT HEAD/BRAIN W/O DYE: CPT | Performed by: RADIOLOGY

## 2025-08-02 PROCEDURE — 2500000004 HC RX 250 GENERAL PHARMACY W/ HCPCS (ALT 636 FOR OP/ED): Mod: JW | Performed by: EMERGENCY MEDICINE

## 2025-08-02 RX ORDER — LABETALOL HYDROCHLORIDE 5 MG/ML
10 INJECTION, SOLUTION INTRAVENOUS EVERY 10 MIN PRN
Status: DISCONTINUED | OUTPATIENT
Start: 2025-08-02 | End: 2025-08-03 | Stop reason: HOSPADM

## 2025-08-02 RX ORDER — LOSARTAN POTASSIUM 50 MG/1
100 TABLET ORAL DAILY
Status: DISCONTINUED | OUTPATIENT
Start: 2025-08-03 | End: 2025-08-03 | Stop reason: HOSPADM

## 2025-08-02 RX ORDER — PANTOPRAZOLE SODIUM 40 MG/1
40 TABLET, DELAYED RELEASE ORAL
Status: DISCONTINUED | OUTPATIENT
Start: 2025-08-03 | End: 2025-08-03 | Stop reason: HOSPADM

## 2025-08-02 RX ORDER — ALBUTEROL SULFATE 0.83 MG/ML
3 SOLUTION RESPIRATORY (INHALATION) EVERY 6 HOURS PRN
Status: DISCONTINUED | OUTPATIENT
Start: 2025-08-02 | End: 2025-08-02

## 2025-08-02 RX ORDER — ENOXAPARIN SODIUM 100 MG/ML
40 INJECTION SUBCUTANEOUS EVERY 24 HOURS
Status: DISCONTINUED | OUTPATIENT
Start: 2025-08-02 | End: 2025-08-03 | Stop reason: HOSPADM

## 2025-08-02 RX ORDER — TALC
3 POWDER (GRAM) TOPICAL DAILY
Status: DISCONTINUED | OUTPATIENT
Start: 2025-08-02 | End: 2025-08-03 | Stop reason: HOSPADM

## 2025-08-02 RX ORDER — LABETALOL HYDROCHLORIDE 5 MG/ML
10 INJECTION, SOLUTION INTRAVENOUS ONCE
Status: COMPLETED | OUTPATIENT
Start: 2025-08-02 | End: 2025-08-02

## 2025-08-02 RX ORDER — ALBUTEROL SULFATE 0.83 MG/ML
3 SOLUTION RESPIRATORY (INHALATION) EVERY 2 HOUR PRN
Status: DISCONTINUED | OUTPATIENT
Start: 2025-08-02 | End: 2025-08-03 | Stop reason: HOSPADM

## 2025-08-02 RX ORDER — ESCITALOPRAM OXALATE 10 MG/1
10 TABLET ORAL DAILY
Status: DISCONTINUED | OUTPATIENT
Start: 2025-08-03 | End: 2025-08-03 | Stop reason: HOSPADM

## 2025-08-02 RX ORDER — BUSPIRONE HYDROCHLORIDE 5 MG/1
5 TABLET ORAL 2 TIMES DAILY
Status: DISCONTINUED | OUTPATIENT
Start: 2025-08-02 | End: 2025-08-03 | Stop reason: HOSPADM

## 2025-08-02 RX ORDER — LORAZEPAM 0.5 MG/1
0.5 TABLET ORAL ONCE
Status: COMPLETED | OUTPATIENT
Start: 2025-08-02 | End: 2025-08-02

## 2025-08-02 RX ORDER — POLYETHYLENE GLYCOL 3350 17 G/17G
17 POWDER, FOR SOLUTION ORAL DAILY
Status: DISCONTINUED | OUTPATIENT
Start: 2025-08-02 | End: 2025-08-03 | Stop reason: HOSPADM

## 2025-08-02 RX ORDER — VERAPAMIL HYDROCHLORIDE 180 MG/1
180 TABLET, EXTENDED RELEASE ORAL NIGHTLY
Status: DISCONTINUED | OUTPATIENT
Start: 2025-08-02 | End: 2025-08-03 | Stop reason: HOSPADM

## 2025-08-02 RX ADMIN — LORAZEPAM 0.5 MG: 0.5 TABLET ORAL at 19:46

## 2025-08-02 RX ADMIN — BUSPIRONE HYDROCHLORIDE 5 MG: 5 TABLET ORAL at 20:46

## 2025-08-02 RX ADMIN — ENOXAPARIN SODIUM 40 MG: 100 INJECTION SUBCUTANEOUS at 18:14

## 2025-08-02 RX ADMIN — LABETALOL HYDROCHLORIDE 10 MG: 5 INJECTION, SOLUTION INTRAVENOUS at 16:38

## 2025-08-02 RX ADMIN — VERAPAMIL HYDROCHLORIDE 180 MG: 180 TABLET ORAL at 20:46

## 2025-08-02 RX ADMIN — Medication 3 MG: at 21:06

## 2025-08-02 SDOH — SOCIAL STABILITY: SOCIAL INSECURITY: WITHIN THE LAST YEAR, HAVE YOU BEEN HUMILIATED OR EMOTIONALLY ABUSED IN OTHER WAYS BY YOUR PARTNER OR EX-PARTNER?: NO

## 2025-08-02 SDOH — ECONOMIC STABILITY: FOOD INSECURITY: WITHIN THE PAST 12 MONTHS, THE FOOD YOU BOUGHT JUST DIDN'T LAST AND YOU DIDN'T HAVE MONEY TO GET MORE.: NEVER TRUE

## 2025-08-02 SDOH — SOCIAL STABILITY: SOCIAL INSECURITY
WITHIN THE LAST YEAR, HAVE YOU BEEN RAPED OR FORCED TO HAVE ANY KIND OF SEXUAL ACTIVITY BY YOUR PARTNER OR EX-PARTNER?: NO

## 2025-08-02 SDOH — ECONOMIC STABILITY: FOOD INSECURITY: WITHIN THE PAST 12 MONTHS, YOU WORRIED THAT YOUR FOOD WOULD RUN OUT BEFORE YOU GOT THE MONEY TO BUY MORE.: NEVER TRUE

## 2025-08-02 SDOH — SOCIAL STABILITY: SOCIAL INSECURITY
WITHIN THE LAST YEAR, HAVE YOU BEEN KICKED, HIT, SLAPPED, OR OTHERWISE PHYSICALLY HURT BY YOUR PARTNER OR EX-PARTNER?: NO

## 2025-08-02 SDOH — ECONOMIC STABILITY: INCOME INSECURITY: IN THE PAST 12 MONTHS HAS THE ELECTRIC, GAS, OIL, OR WATER COMPANY THREATENED TO SHUT OFF SERVICES IN YOUR HOME?: NO

## 2025-08-02 SDOH — SOCIAL STABILITY: SOCIAL INSECURITY: WITHIN THE LAST YEAR, HAVE YOU BEEN AFRAID OF YOUR PARTNER OR EX-PARTNER?: NO

## 2025-08-02 SDOH — SOCIAL STABILITY: SOCIAL INSECURITY: HAVE YOU HAD THOUGHTS OF HARMING ANYONE ELSE?: NO

## 2025-08-02 SDOH — SOCIAL STABILITY: SOCIAL INSECURITY: WERE YOU ABLE TO COMPLETE ALL THE BEHAVIORAL HEALTH SCREENINGS?: YES

## 2025-08-02 ASSESSMENT — LIFESTYLE VARIABLES
EVER HAD A DRINK FIRST THING IN THE MORNING TO STEADY YOUR NERVES TO GET RID OF A HANGOVER: NO
TOTAL SCORE: 0
AUDIT-C TOTAL SCORE: -1
HAVE YOU EVER FELT YOU SHOULD CUT DOWN ON YOUR DRINKING: NO
HOW OFTEN DO YOU HAVE A DRINK CONTAINING ALCOHOL: PATIENT DECLINED
HOW MANY STANDARD DRINKS CONTAINING ALCOHOL DO YOU HAVE ON A TYPICAL DAY: PATIENT DECLINED
HAVE PEOPLE ANNOYED YOU BY CRITICIZING YOUR DRINKING: NO
EVER FELT BAD OR GUILTY ABOUT YOUR DRINKING: NO
AUDIT-C TOTAL SCORE: -1
HOW OFTEN DO YOU HAVE 6 OR MORE DRINKS ON ONE OCCASION: PATIENT DECLINED
SKIP TO QUESTIONS 9-10: 0

## 2025-08-02 ASSESSMENT — ENCOUNTER SYMPTOMS
PALPITATIONS: 0
FEVER: 0
WHEEZING: 0
PHOTOPHOBIA: 0
DIARRHEA: 0
CHILLS: 0
CHEST TIGHTNESS: 0
DIZZINESS: 1
NAUSEA: 1
HEADACHES: 1
LIGHT-HEADEDNESS: 1
CONFUSION: 0
WEAKNESS: 0
SHORTNESS OF BREATH: 0
NUMBNESS: 0

## 2025-08-02 ASSESSMENT — COGNITIVE AND FUNCTIONAL STATUS - GENERAL
PATIENT BASELINE BEDBOUND: NO
DAILY ACTIVITIY SCORE: 24
DAILY ACTIVITIY SCORE: 24
MOBILITY SCORE: 24
MOBILITY SCORE: 24

## 2025-08-02 ASSESSMENT — ACTIVITIES OF DAILY LIVING (ADL)
ADEQUATE_TO_COMPLETE_ADL: YES
TOILETING: INDEPENDENT
PATIENT'S MEMORY ADEQUATE TO SAFELY COMPLETE DAILY ACTIVITIES?: YES
DRESSING YOURSELF: INDEPENDENT
GROOMING: INDEPENDENT
JUDGMENT_ADEQUATE_SAFELY_COMPLETE_DAILY_ACTIVITIES: YES
BATHING: INDEPENDENT
FEEDING YOURSELF: INDEPENDENT
WALKS IN HOME: INDEPENDENT
LACK_OF_TRANSPORTATION: NO
HEARING - LEFT EAR: FUNCTIONAL
HEARING - RIGHT EAR: FUNCTIONAL

## 2025-08-02 ASSESSMENT — PAIN SCALES - GENERAL
PAINLEVEL_OUTOF10: 0 - NO PAIN

## 2025-08-02 ASSESSMENT — PATIENT HEALTH QUESTIONNAIRE - PHQ9
1. LITTLE INTEREST OR PLEASURE IN DOING THINGS: NOT AT ALL
SUM OF ALL RESPONSES TO PHQ9 QUESTIONS 1 & 2: 0
2. FEELING DOWN, DEPRESSED OR HOPELESS: NOT AT ALL

## 2025-08-02 ASSESSMENT — PAIN - FUNCTIONAL ASSESSMENT
PAIN_FUNCTIONAL_ASSESSMENT: 0-10
PAIN_FUNCTIONAL_ASSESSMENT: 0-10

## 2025-08-02 NOTE — ED PROCEDURE NOTE
Procedure  Critical Care    Performed by: Coleman Mcnamara MD  Authorized by: Coleman Mcnamara MD    Critical care provider statement:     Critical care time (minutes):  55    Critical care time was exclusive of:  Separately billable procedures and treating other patients and teaching time    Critical care was necessary to treat or prevent imminent or life-threatening deterioration of the following conditions:  CNS failure or compromise    Critical care was time spent personally by me on the following activities:  Ordering and performing treatments and interventions, ordering and review of laboratory studies, ordering and review of radiographic studies, pulse oximetry, discussions with consultants, development of treatment plan with patient or surrogate, re-evaluation of patient's condition, examination of patient, evaluation of patient's response to treatment and review of old charts    Care discussed with: admitting provider                 Coleman Mcnamara MD  08/02/25 [id number]

## 2025-08-02 NOTE — H&P
Internal Medicine - History and Physical Note      Patient: Jeff Rayo, Age: 65 y.o., SEX: male , MRN:[id number], ROOM:223/[facility],  Code: Full Code   Admitted On: 8/2/2025   Admitting Dx: Hypertensive emergency [I16.1]  Hypertensive encephalopathy [I67.4]  PCP: Mil Steele MD        Attending: Michael Lazo DO        Chief Complaint   Patient: Jeff Rayo is a 65 y.o. male who presented to the hospital for   Chief Complaint   Patient presents with    Hypertension     Pt brought in by [facility] for c/o HTN, nausea and mid abd pain. Pt was taken off some HTN medication recently. Pt also noted to have a Rt facial droop, weakness in rt leg and Lt hand.       HPI   Jeff Rayo is a 65 y.o. year old male  patient with PMH of HLD, HTN, IBS, MARYANN, GERD, cluster headaches, and anxiety presenting for nausea/vomiting in the setting of elevated blood pressure.  Patient has a chronic history of hypertension and typically becomes disoriented, nauseous and lightheaded whenever his blood pressure increases significantly.  He began to experience this when he woke up this morning.  He went to check his blood pressure with his home wrist cuff and found to have systolic blood pressure elevated greater than 200.  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  As such, he contacted EMS and was transferred to [facility].  During his EMS trip, the patient was given 1 dose of sublingual nitroglycerin.  According to the patient, within the past year he was undergoing several medication changes by his PCP, including his blood pressure medicine.  Per EMR, in June 2024, patient's verapamil was initially cut from 180 to 120 ER (which was given initially for cluster headaches), but was restarted during his most recent PCP visit 2 weeks ago. Finally, his felodipine was discontinued 2 weeks ago as well.  Patient has continued to be on losartan 100 mg daily as well.  Per ED  staff, patient's NIHSS was initially 3 upon presentation to the ED for left-sided lower facial droop and numbness/tingling in left arm.  ED staff obtained a CT brain attack and spoke with Dr. Manuel from neurology.  CT showed stable moderate bilateral ventriculomegaly, and no evidence of acute cortical infarct or intracranial hemorrhage. Neurology did not believe patient to have suffered a stroke and believe symptoms are more likely due to hypertensive encephalopathy.  Blood pressure on initial presentation was 188/113.  The patient received 1 dose of labetalol 10 mg in the ED with improvement of blood pressure into the 163/101.  Patient was alert and oriented x 3. He states that his dizziness, nausea, and tingling in left arm improved with improvement of his blood pressure.  He states that his left hand tingling has occurred before with elevated blood pressure and he denies outright numbness/weakness in that arm.  Furthermore, patient states that he was thought to have had a stroke in the past as he has had chronic mild fascial asymmetry where the right part of his lip seems to be higher than the left, however this resolves when the patient fully smiles.  Overall patient states that he is doing much better than prior to admission.  Patient denies history of stroke, CAD, smoking history, family history of heart disease, or arrhythmia/A-fib.    ROS  Review of Systems   Constitutional:  Negative for chills and fever.   Eyes:  Negative for photophobia and visual disturbance.   Respiratory:  Negative for chest tightness, shortness of breath and wheezing.    Cardiovascular:  Negative for chest pain and palpitations.   Gastrointestinal:  Positive for nausea. Negative for diarrhea.   Neurological:  Positive for dizziness, light-headedness and headaches. Negative for syncope, weakness and numbness.   Psychiatric/Behavioral:  Negative for confusion.         12 Point ROS negative unless otherwise specified above.     ED COURSE:  "  Vitals - BP (!) 161/95 (BP Location: Left arm, Patient Position: Lying)   Pulse 59   Temp 37 °C (98.6 °F)   Resp 17   Wt 88.1 kg (194 lb 3.6 oz)   SpO2 97%   Labs -   Lab Results   Component Value Date    WBC 4.7 08/02/2025    HGB 15.3 08/02/2025    HCT 42.8 08/02/2025    MCV 88 08/02/2025     08/02/2025     Lab Results   Component Value Date    GLUCOSE 93 08/02/2025    CALCIUM 9.0 08/02/2025     08/02/2025    K 3.8 08/02/2025    CO2 29 08/02/2025     08/02/2025    BUN 17 08/02/2025    CREATININE 0.89 08/02/2025     Lab Results   Component Value Date    TROPHS 5 08/02/2025   No results found for: \"BNP\"No results found for: \"DDIMERVTE\"  Imaging -   CT brain attack head wo IV contrast   Final Result   Stable moderate bilateral ventriculomegaly. Clinical correlation is   recommended to exclude normal pressure hydrocephalus. No evidence of   acute cortical infarct or intracranial hemorrhage. If there is   persistent clinical concern for acute cortical infarction, MRI with   diffusion-weighted images is a better means for further evaluation as   clinically warranted.        MACRO:   None        Signed by: Christina Arias 8/2/2025 2:53 PM   Dictation workstation:   [id number]         Interventions -   Medications   labetaloL (Normodyne,Trandate) injection 10 mg (10 mg intravenous Given 8/2/25 1638)         Past Medical History:   Medical History[1]  Past Surgical History:   Surgical History[2]  Allergies:   RX Allergies[3]  Family History:   Family History[4]  Home Meds:  Prior to Admission medications   Medication Sig Start Date End Date Taking? Authorizing Provider   albuterol 90 mcg/actuation inhaler Inhale 2 puffs every 4 hours if needed for wheezing or shortness of breath. 11/27/24   Mil Steele MD   busPIRone (Buspar) 5 mg tablet Take 1 tablet (5 mg) by mouth 2 times a day. 7/18/25 7/18/26  Mil Steele MD   EPINEPHrine (EpiPen 2-Zac) 0.3 mg/0.3 mL injection syringe Inject 0.3 " mL (0.3 mg) as directed if needed for anaphylaxis. 8/13/14   Arlyn Chatterjee, MD   escitalopram (Lexapro) 10 mg tablet Take 2 tablets (20 mg) by mouth once daily for 14 days, THEN 1 tablet (10 mg) once daily for 14 days. 7/18/25 8/15/25  Mil Steele MD   losartan (Cozaar) 100 mg tablet Take 1 tablet (100 mg) by mouth once daily. 4/22/25   Mil Steele MD   methocarbamol (Robaxin) 500 mg tablet Take 1-2 tablets (500-1,000 mg) by mouth as needed at bedtime for muscle spasms. 11/27/24 7/25/25  Mil Steele MD   modafinil (Provigil) 200 mg tablet Take 1 tablet (200 mg) by mouth once daily. 7/18/25 1/14/26  Mil Steele MD   omeprazole (PriLOSEC) 40 mg DR capsule Take 1 capsule (40 mg) by mouth once daily in the morning. Take before meals. Do not crush or chew. 7/18/25 7/18/26  Mil Steele MD   rizatriptan (Maxalt) 10 mg tablet Take 1 tablet (10 mg) by mouth 1 time if needed for migraine. May repeat in 2 hours if unresolved. Do not exceed 30 mg in 24 hours. 7/18/25 7/18/26  Mil Steele MD   sildenafil (Viagra) 100 mg tablet Take 1 tablet (100 mg) by mouth if needed for erectile dysfunction. 11/27/24 11/27/25  Mil Steele MD   verapamil SR (Calan-SR) 180 mg ER tablet Take 1 tablet (180 mg) by mouth once daily at bedtime. 7/28/25 7/28/26  Mil Steele MD   verapamil SR (Calan-SR) 120 mg ER tablet Take 1 tablet (120 mg) by mouth once daily at bedtime. 4/22/25 7/28/25  Mil Steele MD     Social History:  - Coming from home  - Tobacco: Never        Meds    Scheduled medications  Scheduled Medications[5]  Continuous medications  Continuous Medications[6]  PRN medications  PRN Medications[7]     Objective    Physical Exam  Constitutional:       General: He is not in acute distress.  HENT:      Head: Normocephalic.      Mouth/Throat:      Mouth: Mucous membranes are moist.     Eyes:      General: No scleral icterus.     Pupils: Pupils are equal, round, and  "reactive to light.       Cardiovascular:      Rate and Rhythm: Normal rate and regular rhythm.      Pulses: Normal pulses.      Heart sounds: Normal heart sounds.   Pulmonary:      Effort: Pulmonary effort is normal. No respiratory distress.      Breath sounds: Normal breath sounds. No wheezing or rales.   Abdominal:      General: Abdomen is flat. There is no distension.      Tenderness: There is no abdominal tenderness. There is no guarding.     Musculoskeletal:      Cervical back: Normal range of motion.      Right lower leg: No edema.      Left lower leg: No edema.     Neurological:      General: No focal deficit present.      Mental Status: He is alert and oriented to person, place, and time. Mental status is at baseline.      Cranial Nerves: No cranial nerve deficit.      Sensory: No sensory deficit.      Motor: No weakness.      Comments: NIHSS of 0, slight left-sided facial droop appears to resolve completely with smiling        Visit Vitals  BP (!) 161/95 (BP Location: Left arm, Patient Position: Lying)   Pulse 59   Temp 37 °C (98.6 °F)   Resp 17   Ht 1.753 m (5' 9\")   Wt 88.1 kg (194 lb 3.6 oz)   SpO2 97%   BMI 28.68 kg/m²   Smoking Status Never   BSA 2.07 m²        No intake or output data in the 24 hours ending 08/02/25 1742          Labs:   Results from last 72 hours   Lab Units 08/02/25  1445   SODIUM mmol/L 140   POTASSIUM mmol/L 3.8   CHLORIDE mmol/L 103   CO2 mmol/L 29   BUN mg/dL 17   CREATININE mg/dL 0.89   GLUCOSE mg/dL 93   CALCIUM mg/dL 9.0   ANION GAP mmol/L 12   EGFR mL/min/1.73m*2 >90      Results from last 72 hours   Lab Units 08/02/25  1445   WBC AUTO x10*3/uL 4.7   HEMOGLOBIN g/dL 15.3   HEMATOCRIT % 42.8   PLATELETS AUTO x10*3/uL 200   NEUTROS PCT AUTO % 59.6   LYMPHS PCT AUTO % 30.0   MONOS PCT AUTO % 7.7   EOS PCT AUTO % 1.9      Lab Results   Component Value Date    CALCIUM 9.0 08/02/2025      Lab Results   Component Value Date    CRP 0.21 05/07/2020       [unfilled]     Micro/ID: " "  No results found for the last 90 days.    No results found for: \"URINECULTURE\", \"BLOODCULT\", \"CSFCULTSMEAR\"                 No lab exists for component: \"AGALPCRNB\"           Assessment and Plan    Jeff Rayo is a 65 y.o. male admitted on 8/2/2025 with PMH of HLD, HTN, IBS, MARYANN, GERD, cluster headaches, and anxiety presenting for hypertensive encephalopathy    ACUTE MEDICAL ISSUES:  #Hypertensive Encephalopathy (improved)  CT imaging with stable moderate bilateral ventriculomegaly, no signs of acute ischemic changes or intracranial bleed. Neurology consulted in ED and report low suspicion for CVA  Symptoms improved/resolved with improvement of blood pressure  Received labetalol 10mg in ED, with improvement in BP.   NIHSS of 0 on repeat exam  Plan:  Will order MRI/MRA of head and neck to evaluate for stroke, however overall low suspicion  Resume home losartan 100mg and Verapamil 180 mg nightly  Labetalol PRN for SBP>180,  will not aggressively treat while MRI is pending.  Will allow for SBP in the 160s-180s while asymptomatic. Low overall suspicion for stroke however.  Patient passed bedside swallow in ED, may initiate diet  PT/OT consult  Neurochecks Q4H  Telemetry      CHRONIC MEDICAL ISSUES:  #GERD-resume home PPI, switched to Protonix on formulary.  #Anxiety-resume home escitalopram and Buspar    Fluids: PRN  Electrolytes: PRN  Nutrition: Regular Diet, passed bedside swallow  Antimicrobials: None  DVT ppx: Lovenox  GI ppx: PPI  Catheter: None  Lines: PIV  Supplemental Oxygen: RA  HealthCare Providers:  - PCP: Mil Steele MD   Emergency Contact: Extended Emergency Contact Information  Primary Emergency Contact: catashashi  Address: [address] [place] [address]  Home Phone: [phone]  Relation: Daughter  Secondary Emergency Contact: REBECCA MCCLAIN  Mobile Phone: [phone]  Relation: Son  Preferred language: English   needed? No   Code: " Full Code     Disposition: 66 Yo male admitted for hypertensive encephalopathy, improving. ELOS<48 hours.    Jeff Cruz DO  Internal Medicine, PGY- 2  08/02/25 at 5:42 PM                  [1]   Past Medical History:  Diagnosis Date    Angioedema 08/02/2025    Chronic post-COVID-19 syndrome 08/02/2025    Colitis 08/02/2025    Cough 12/01/2021    Knee pain 08/02/2025    Noninfective gastroenteritis and colitis, unspecified 08/17/2016    Colitis    Personal history of diseases of the skin and subcutaneous tissue 10/22/2014    History of urticaria    Personal history of diseases of the skin and subcutaneous tissue 06/03/2015    History of urticaria    Personal history of other diseases of the digestive system 10/21/2019    History of dental abscess    Personal history of other diseases of the nervous system and sense organs 05/03/2021    History of cluster headache    Personal history of other diseases of the respiratory system 03/09/2016    History of sinusitis    Unspecified symptoms and signs involving the genitourinary system 09/27/2019    UTI symptoms   [2]   Past Surgical History:  Procedure Laterality Date    COLONOSCOPY  05/07/2014    Complete Colonoscopy    ESOPHAGOGASTRODUODENOSCOPY  05/07/2014    Diagnostic Esophagogastroduodenoscopy    KNEE ARTHROSCOPY W/ DEBRIDEMENT  05/07/2014    Arthroscopy Knee Right    MR HEAD ANGIO WO IV CONTRAST  6/1/2014    MR HEAD ANGIO WO IV CONTRAST 6/1/2014 [facility]    MR HEAD ANGIO WO IV CONTRAST  6/1/2014    MR HEAD ANGIO WO IV CONTRAST 6/1/2014 [facility]    MR NECK ANGIO WO IV CONTRAST  6/1/2014    MR NECK ANGIO WO IV CONTRAST 6/1/2014 [facility]   [3]   Allergies  Allergen Reactions    Iodinated Contrast Media Unknown    Penicillins Unknown    Sulfamethoxazole-Trimethoprim Unknown   [4]   Family History  Problem Relation Name Age of Onset    Other (old age) Mother      Other (old age) Father     [5] busPIRone, 5 mg, oral,  BID  enoxaparin, 40 mg, subcutaneous, q24h  [START ON 8/3/2025] escitalopram, 10 mg, oral, Daily  [START ON 8/3/2025] losartan, 100 mg, oral, Daily  [START ON 8/3/2025] pantoprazole, 40 mg, oral, Daily before breakfast  polyethylene glycol, 17 g, oral, Daily  verapamil SR, 180 mg, oral, Nightly     [6]    [7] PRN medications: albuterol, labetaloL

## 2025-08-02 NOTE — CARE PLAN
Problem: Pain - Adult  Goal: Verbalizes/displays adequate comfort level or baseline comfort level  8/2/2025 1845 by Juliet Manzano RN  Outcome: Progressing  8/2/2025 1844 by Juliet Manzano RN  Outcome: Progressing     Problem: Safety - Adult  Goal: Free from fall injury  8/2/2025 1845 by Juliet Manzano RN  Outcome: Progressing  8/2/2025 1844 by Juliet Manzano RN  Outcome: Progressing     Problem: Discharge Planning  Goal: Discharge to home or other facility with appropriate resources  8/2/2025 1845 by Juliet Manzano RN  Outcome: Progressing  8/2/2025 1844 by Juliet Manzano RN  Outcome: Progressing     Problem: Chronic Conditions and Co-morbidities  Goal: Patient's chronic conditions and co-morbidity symptoms are monitored and maintained or improved  8/2/2025 1845 by Juliet Manzano RN  Outcome: Progressing  8/2/2025 1844 by Juliet Manzano RN  Outcome: Progressing     Problem: General Stroke  Goal: Establish a mutual long term goal with patient by discharge  Outcome: Progressing  Goal: Demonstrate improvement in neurological exam throughout the shift  Outcome: Progressing  Goal: Maintain BP within ordered limits throughout shift  Outcome: Progressing  Goal: Participate in treatment (ie., meds, therapy) throughout shift  Outcome: Progressing  Goal: No symptoms of aspiration throughout shift  Outcome: Progressing  Goal: No symptoms of hemorrhage throughout shift  Outcome: Progressing  Goal: Tolerate enteral feeding throughout shift  Outcome: Progressing  Goal: Decreased nausea/vomiting throughout shift  Outcome: Progressing  Goal: Controlled blood glucose throughout shift  Outcome: Progressing  Goal: Out of bed three times today  Outcome: Progressing   The patient's goals for the shift include  to get some rest     The clinical goals for the shift include pt to remain hemodynamically stable throughout the shift and to get MRI.     Over the shift, the patient did not make progress toward the  following goals. Barriers to progression include none. Recommendations to address these barriers include none.

## 2025-08-02 NOTE — ED PROVIDER NOTES
HPI   Chief Complaint   Patient presents with    Hypertension     Pt brought in by [facility] for c/o HTN, nausea and mid abd pain. Pt was taken off some HTN medication recently. Pt also noted to have a Rt facial droop, weakness in rt leg and Lt hand.       Jeff is a 65-year-old male who presents with nausea.  EMS noted patient seemed to have a asymmetric face and they had high blood pressure.  His sugar was close to 100.  He was last normal at 10 PM last night when he went to bed.  This morning when he woke up his nausea caused him to check his blood pressure which was elevated.  He came in by EMS for further evaluation treatment.  His primary care physician has been adjusting his blood pressure medicines.  He denies any fever chills cough or cold.  He denies any headache.  EMS gave 1 sublingual nitroglycerin and transported the patient.  Patient's past medical history significant for hypertension.  History comes from patient EMR and EMS              Patient History   Medical History[1]  Surgical History[2]  Family History[3]  Social History[4]    Physical Exam   ED Triage Vitals   Temp Pulse Resp BP   -- -- -- --      SpO2 Temp src Heart Rate Source Patient Position   -- -- -- --      BP Location FiO2 (%)     -- --       Physical Exam  Vitals reviewed.   Constitutional:       General: He is awake.   HENT:      Head: Normocephalic.      Nose: Nose normal.     Cardiovascular:      Rate and Rhythm: Normal rate and regular rhythm.   Pulmonary:      Effort: Pulmonary effort is normal.      Breath sounds: Normal breath sounds.   Abdominal:      Palpations: Abdomen is soft.     Musculoskeletal:      Cervical back: Normal range of motion.     Skin:     General: Skin is warm.      Capillary Refill: Capillary refill takes less than 2 seconds.     Neurological:      Mental Status: He is alert.      Comments: Slight asymmetry noted in the face decreased nasolabial fold and movements of the left side of the face to the  lips.    Motor movements are symmetric and equal strength.    Patient has difficulty with heel-to-shin using his right foot.    Some dysdiadochokinesia noted with finger to thumb on both sides.         ED Course & MDM   ED Course as of 08/02/25 1626   Sat Aug 02, 2025   1435 Patient's blood pressures elevated.  He has some new neurodeficits appreciated.  He is Van negative.  Plan is to workup with CT scan a stroke alert was paged.  Patient cannot have contrast due to an allergy.  Will contact and discuss further with the neuro attending.   [RZ]   1513 Spoke to Dr. Manuel from neurology who will teleconference with the patient.  On reexam patient reports feeling little better. [RZ]   1517 EKG done at 1443 interpreted by me shows normal sinus rhythm at 63 bpm with some nonspecific T wave abnormality no sniffing change versus June 1, 2014 no STEMI [RZ]      ED Course User Index  [RZ] Coleman Mcnamara MD         Diagnoses as of 08/02/25 1626   Hypertensive emergency                 No data recorded     Carina Coma Scale Score: 15 (08/02/25 1455 : Sarah Siddiqui, RN)       NIH Stroke Scale: 0 (08/02/25 1600 : Sarah Siddiqui RN)                   Medical Decision Making      Procedure  Procedures       [1]   Past Medical History:  Diagnosis Date    Noninfective gastroenteritis and colitis, unspecified 08/17/2016    Colitis    Personal history of diseases of the skin and subcutaneous tissue 10/22/2014    History of urticaria    Personal history of diseases of the skin and subcutaneous tissue 06/03/2015    History of urticaria    Personal history of other diseases of the digestive system 10/21/2019    History of dental abscess    Personal history of other diseases of the nervous system and sense organs 05/03/2021    History of cluster headache    Personal history of other diseases of the respiratory system 03/09/2016    History of sinusitis    Unspecified symptoms and signs involving the genitourinary system 09/27/2019    UTI  symptoms   [2]   Past Surgical History:  Procedure Laterality Date    COLONOSCOPY  05/07/2014    Complete Colonoscopy    ESOPHAGOGASTRODUODENOSCOPY  05/07/2014    Diagnostic Esophagogastroduodenoscopy    KNEE ARTHROSCOPY W/ DEBRIDEMENT  05/07/2014    Arthroscopy Knee Right    MR HEAD ANGIO WO IV CONTRAST  6/1/2014    MR HEAD ANGIO WO IV CONTRAST 6/1/2014 [facility]    MR HEAD ANGIO WO IV CONTRAST  6/1/2014    MR HEAD ANGIO WO IV CONTRAST 6/1/2014 [facility]    MR NECK ANGIO WO IV CONTRAST  6/1/2014    MR NECK ANGIO WO IV CONTRAST 6/1/2014 [facility]   [3]   Family History  Problem Relation Name Age of Onset    Other (old age) Mother      Other (old age) Father     [4]   Social History  Tobacco Use    Smoking status: Never     Passive exposure: Never    Smokeless tobacco: Never   Vaping Use    Vaping status: Never Used   Substance Use Topics    Alcohol use: Not Currently     Comment: n/a    Drug use: Never        Coleman Mcnamara MD  08/02/25 8039

## 2025-08-02 NOTE — ED TRIAGE NOTES
Pt brought in by [facility] for c/o HTN, nausea and mid abd pain. Pt was taken off some HTN medication recently. Pt also noted to have a Rt facial droop, weakness in rt leg and Lt hand.

## 2025-08-02 NOTE — CONSULTS
"Inpatient consult to Neuro TeleStroke  Consult performed by: Guillermo Manuel MD  Consult ordered by: Coleman Mcnamara MD        Virtual Visit start time: 3:30    History Of Present Illness:  Historian: Patient and ED Provider   Jeff Rayo is a 65 y.o. male presenting with stomach ache, nausea, confusion, high BP.  Last known well: 10PM last night     Pt reports he has had his BP meds changed 2 weeks ago with his PCP. His felodipine was stopped (looks like due to concurrent use of verapamil, aiming to treat his headaches?). Since then, pt reports he has been having issues with high BP at home, symptomatic with nausea / stomach ache, feeling confused. Denies headaches, vision changes. Had L arm tingling sensation. Came to ED for evaluation. No weakness, balance issues or falls.     In the ED, BP noted to be high at 188/103, subsequently lowered with labetalol down to 160s. Since then, pt reports his sx has essentially resolved, including his L arm tingling sensation.     Had stroke symptoms resolved at time of presentation: Yes   Current antiplatelet/anticoagulant use: None    Prior Functional Status (Modified [place] Scale):  0 The patient has no residual symptoms.    Stroke Risk Factors:  Hypertension    Last Recorded Vitals:  Blood pressure (!) 163/101, pulse 56, temperature 37 °C (98.6 °F), temperature source Temporal, resp. rate 14, height 1.753 m (5' 9\"), weight 83.9 kg (185 lb), SpO2 96%.    Physical Exam:  =    NIHSS   1A. Level of Consciousness: 0  1B. Ask Month and Age: 0  1C. Blink Eyes & Squeeze Hands: 0  2. Best Gaze: 0  3. Visual: 0  4. Facial Palsy: 0  5A. Motor - Left Arm: 0  5B. Motor - Right Arm: 0  6A. Motor - Left Le  6B. Motor - Right Le  7. Limb Ataxia: 0  8. Sensory Loss: 0  9. Best Language: 0  10. Dysarthria: 0  11. Extinction and Inattention: 0  NIH Stroke Scale: 0       Relevant Results:  LABS:  Glucose   Date Value Ref Range Status   2025 93 74 - 99 mg/dL Final     INR "   Date Value Ref Range Status   08/02/2025 0.9 0.9 - 1.1 Final      Results for orders placed or performed during the hospital encounter of 08/02/25 (from the past 24 hours)   POCT GLUCOSE   Result Value Ref Range    POCT Glucose 98 74 - 99 mg/dL   CBC and Auto Differential   Result Value Ref Range    WBC 4.7 4.4 - 11.3 x10*3/uL    nRBC 0.0 0.0 - 0.0 /100 WBCs    RBC 4.84 4.50 - 5.90 x10*6/uL    Hemoglobin 15.3 13.5 - 17.5 g/dL    Hematocrit 42.8 41.0 - 52.0 %    MCV 88 80 - 100 fL    MCH 31.6 26.0 - 34.0 pg    MCHC 35.7 32.0 - 36.0 g/dL    RDW 12.8 11.5 - 14.5 %    Platelets 200 150 - 450 x10*3/uL    Neutrophils % 59.6 40.0 - 80.0 %    Immature Granulocytes %, Automated 0.4 0.0 - 0.9 %    Lymphocytes % 30.0 13.0 - 44.0 %    Monocytes % 7.7 2.0 - 10.0 %    Eosinophils % 1.9 0.0 - 6.0 %    Basophils % 0.4 0.0 - 2.0 %    Neutrophils Absolute 2.77 1.20 - 7.70 x10*3/uL    Immature Granulocytes Absolute, Automated 0.02 0.00 - 0.70 x10*3/uL    Lymphocytes Absolute 1.40 1.20 - 4.80 x10*3/uL    Monocytes Absolute 0.36 0.10 - 1.00 x10*3/uL    Eosinophils Absolute 0.09 0.00 - 0.70 x10*3/uL    Basophils Absolute 0.02 0.00 - 0.10 x10*3/uL   Comprehensive metabolic panel   Result Value Ref Range    Glucose 93 74 - 99 mg/dL    Sodium 140 136 - 145 mmol/L    Potassium 3.8 3.5 - 5.3 mmol/L    Chloride 103 98 - 107 mmol/L    Bicarbonate 29 21 - 32 mmol/L    Anion Gap 12 10 - 20 mmol/L    Urea Nitrogen 17 6 - 23 mg/dL    Creatinine 0.89 0.50 - 1.30 mg/dL    eGFR >90 >60 mL/min/1.73m*2    Calcium 9.0 8.6 - 10.3 mg/dL    Albumin 4.7 3.4 - 5.0 g/dL    Alkaline Phosphatase 85 33 - 136 U/L    Total Protein 6.7 6.4 - 8.2 g/dL    AST 19 9 - 39 U/L    Bilirubin, Total 0.6 0.0 - 1.2 mg/dL    ALT 27 10 - 52 U/L   Troponin I, High Sensitivity   Result Value Ref Range    Troponin I, High Sensitivity 5 0 - 20 ng/L   Protime-INR   Result Value Ref Range    Protime 10.3 9.8 - 12.4 seconds    INR 0.9 0.9 - 1.1   APTT   Result Value Ref Range    aPTT  30 26 - 36 seconds        CT Head Imaging:  [facility] imaging personally reviewed, showed no acute ischemic / hemorrhagic changes     CTA Head and Neck Imaging:  CTA imaging not performed     CT Perfusion Imaging:  N/a    Diagnosis:  Stroke not suspected, alternative etiology likely     Assessment/Plan:  65yoM with hx of HTN presenting with hypertensive encephalopathy, already improved.     IV Thrombolysis IV Thrombolysis Checklist        IV Thrombolysis Given: No; Thrombolysis contraindication reason: Working diagnosis is NOT a suspected ischemic stroke and Neurologic signs have spontaneously resolved           Patient is a candidate for thrombectomy:  yes/no: No; contraindication reason: NIHSS <6    Additional Recommendations:  Recommend BP control per ED / primary     Disposition:  Patient will remain at referring facility for further evaluation and management.    Virtual or Telephone Consent    An interactive audio and video telecommunication system which permits real time communications between the patient (at the originating site) and provider (at the distant site) was utilized to provide this telehealth service.   Verbal consent was requested and obtained from Jeff Rayo on this date, 08/02/25 for a telehealth visit.      MDM was moderate complexity.     Telestroke is covered in shift work. If there are further Neurological questions or concerns please contact your regional neurologist on call during daytime hours or contact the transfer center with an ADT20 order.    Guillermo Manuel MD

## 2025-08-03 ENCOUNTER — APPOINTMENT (OUTPATIENT)
Dept: RADIOLOGY | Facility: HOSPITAL | Age: 65
End: 2025-08-03
Payer: COMMERCIAL

## 2025-08-03 VITALS
SYSTOLIC BLOOD PRESSURE: 161 MMHG | HEIGHT: 69 IN | BODY MASS INDEX: 29.06 KG/M2 | TEMPERATURE: 98.1 F | WEIGHT: 196.21 LBS | DIASTOLIC BLOOD PRESSURE: 96 MMHG | RESPIRATION RATE: 20 BRPM | HEART RATE: 55 BPM | OXYGEN SATURATION: 95 %

## 2025-08-03 PROBLEM — I67.4 HYPERTENSIVE ENCEPHALOPATHY: Status: RESOLVED | Noted: 2025-08-02 | Resolved: 2025-08-03

## 2025-08-03 LAB
ALBUMIN SERPL BCP-MCNC: 3.9 G/DL (ref 3.4–5)
ANION GAP SERPL CALC-SCNC: 9 MMOL/L (ref 10–20)
BUN SERPL-MCNC: 17 MG/DL (ref 6–23)
CALCIUM SERPL-MCNC: 8.6 MG/DL (ref 8.6–10.3)
CHLORIDE SERPL-SCNC: 103 MMOL/L (ref 98–107)
CO2 SERPL-SCNC: 33 MMOL/L (ref 21–32)
CREAT SERPL-MCNC: 1 MG/DL (ref 0.5–1.3)
EGFRCR SERPLBLD CKD-EPI 2021: 85 ML/MIN/1.73M*2
ERYTHROCYTE [DISTWIDTH] IN BLOOD BY AUTOMATED COUNT: 12.9 % (ref 11.5–14.5)
EST. AVERAGE GLUCOSE BLD GHB EST-MCNC: 94 MG/DL
GLUCOSE SERPL-MCNC: 102 MG/DL (ref 74–99)
HBA1C MFR BLD: 4.9 % (ref ?–5.7)
HCT VFR BLD AUTO: 41.3 % (ref 41–52)
HGB BLD-MCNC: 14.3 G/DL (ref 13.5–17.5)
MAGNESIUM SERPL-MCNC: 2.01 MG/DL (ref 1.6–2.4)
MCH RBC QN AUTO: 31.4 PG (ref 26–34)
MCHC RBC AUTO-ENTMCNC: 34.6 G/DL (ref 32–36)
MCV RBC AUTO: 91 FL (ref 80–100)
NRBC BLD-RTO: 0 /100 WBCS (ref 0–0)
PHOSPHATE SERPL-MCNC: 4.5 MG/DL (ref 2.5–4.9)
PLATELET # BLD AUTO: 155 X10*3/UL (ref 150–450)
POTASSIUM SERPL-SCNC: 3.9 MMOL/L (ref 3.5–5.3)
RBC # BLD AUTO: 4.56 X10*6/UL (ref 4.5–5.9)
SODIUM SERPL-SCNC: 141 MMOL/L (ref 136–145)
WBC # BLD AUTO: 4.3 X10*3/UL (ref 4.4–11.3)

## 2025-08-03 PROCEDURE — 99239 HOSP IP/OBS DSCHRG MGMT >30: CPT

## 2025-08-03 PROCEDURE — 2500000001 HC RX 250 WO HCPCS SELF ADMINISTERED DRUGS (ALT 637 FOR MEDICARE OP)

## 2025-08-03 PROCEDURE — 70547 MR ANGIOGRAPHY NECK W/O DYE: CPT

## 2025-08-03 PROCEDURE — 70544 MR ANGIOGRAPHY HEAD W/O DYE: CPT | Performed by: RADIOLOGY

## 2025-08-03 PROCEDURE — 70547 MR ANGIOGRAPHY NECK W/O DYE: CPT | Performed by: RADIOLOGY

## 2025-08-03 PROCEDURE — G0378 HOSPITAL OBSERVATION PER HR: HCPCS

## 2025-08-03 PROCEDURE — 80069 RENAL FUNCTION PANEL: CPT

## 2025-08-03 PROCEDURE — 85027 COMPLETE CBC AUTOMATED: CPT

## 2025-08-03 PROCEDURE — 70544 MR ANGIOGRAPHY HEAD W/O DYE: CPT

## 2025-08-03 PROCEDURE — 83735 ASSAY OF MAGNESIUM: CPT

## 2025-08-03 RX ORDER — ACETAMINOPHEN 325 MG/1
650 TABLET ORAL EVERY 6 HOURS PRN
Status: DISCONTINUED | OUTPATIENT
Start: 2025-08-03 | End: 2025-08-03 | Stop reason: HOSPADM

## 2025-08-03 RX ORDER — CHLORTHALIDONE 25 MG/1
12.5 TABLET ORAL DAILY
Status: DISCONTINUED | OUTPATIENT
Start: 2025-08-03 | End: 2025-08-03 | Stop reason: HOSPADM

## 2025-08-03 RX ADMIN — PANTOPRAZOLE SODIUM 40 MG: 40 TABLET, DELAYED RELEASE ORAL at 06:31

## 2025-08-03 RX ADMIN — ESCITALOPRAM OXALATE 10 MG: 10 TABLET ORAL at 08:23

## 2025-08-03 RX ADMIN — CHLORTHALIDONE 12.5 MG: 25 TABLET ORAL at 12:25

## 2025-08-03 RX ADMIN — LOSARTAN POTASSIUM 100 MG: 50 TABLET, FILM COATED ORAL at 08:23

## 2025-08-03 RX ADMIN — BUSPIRONE HYDROCHLORIDE 5 MG: 5 TABLET ORAL at 08:23

## 2025-08-03 ASSESSMENT — COGNITIVE AND FUNCTIONAL STATUS - GENERAL
DAILY ACTIVITIY SCORE: 24
MOBILITY SCORE: 24

## 2025-08-03 ASSESSMENT — PAIN - FUNCTIONAL ASSESSMENT: PAIN_FUNCTIONAL_ASSESSMENT: 0-10

## 2025-08-03 ASSESSMENT — PAIN SCALES - GENERAL: PAINLEVEL_OUTOF10: 0 - NO PAIN

## 2025-08-03 ASSESSMENT — ACTIVITIES OF DAILY LIVING (ADL): LACK_OF_TRANSPORTATION: NO

## 2025-08-03 NOTE — DISCHARGE SUMMARY
Discharge Diagnosis  Hypertensive encephalopathy       Issues Requiring Follow-Up  Hypertension Medications    Discharge Meds     Medication List      ASK your doctor about these medications     albuterol 90 mcg/actuation inhaler; Inhale 2 puffs every 4 hours if   needed for wheezing or shortness of breath.   busPIRone 5 mg tablet; Commonly known as: Buspar; Take 1 tablet (5 mg)   by mouth 2 times a day.   EpiPen 2-Zac 0.3 mg/0.3 mL injection syringe; Generic drug: EPINEPHrine   escitalopram 10 mg tablet; Commonly known as: Lexapro; Take 2 tablets   (20 mg) by mouth once daily for 14 days, THEN 1 tablet (10 mg) once daily   for 14 days.; Start taking on: July 18, 2025   losartan 100 mg tablet; Commonly known as: Cozaar; Take 1 tablet (100   mg) by mouth once daily.   methocarbamol 500 mg tablet; Commonly known as: Robaxin; Take 1-2   tablets (500-1,000 mg) by mouth as needed at bedtime for muscle spasms.   modafinil 200 mg tablet; Commonly known as: Provigil; Take 1 tablet (200   mg) by mouth once daily.   omeprazole 40 mg DR capsule; Commonly known as: PriLOSEC; Take 1 capsule   (40 mg) by mouth once daily in the morning. Take before meals. Do not   crush or chew.   rizatriptan 10 mg tablet; Commonly known as: Maxalt; Take 1 tablet (10   mg) by mouth 1 time if needed for migraine. May repeat in 2 hours if   unresolved. Do not exceed 30 mg in 24 hours.   sildenafil 100 mg tablet; Commonly known as: Viagra; Take 1 tablet (100   mg) by mouth if needed for erectile dysfunction.   verapamil  mg ER tablet; Commonly known as: Calan-SR; Take 1   tablet (180 mg) by mouth once daily at bedtime.       Test Results Pending At Discharge  Pending Labs       No current pending labs.            Hospital Course  Jeff Rayo is a 65 y.o. year old male  patient with PMH of HLD, HTN, IBS, MARYANN, GERD, cluster headaches, and anxiety presenting for nausea/vomiting in the setting of elevated blood pressure.  Patient has a  chronic history of hypertension and typically becomes disoriented, nauseous and lightheaded whenever his blood pressure increases significantly.  He began to experience this when he woke up 8/2 morning.  He was found to have systolic blood pressure elevated greater than 200 on home cuff.  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  As such, he contacted EMS and was transferred to [facility].  During his EMS trip, the patient was given 1 dose of sublingual nitroglycerin.  According to the patient, within the past year he was undergoing several medication changes by his PCP, including his blood pressure medicine.       Per ED staff, patient's NIHSS was initially 3 upon presentation to the ED for left-sided lower facial droop and numbness/tingling in left arm.  ED staff obtained a CT brain attack and spoke with Dr. Manuel from neurology.  CT showed stable moderate bilateral ventriculomegaly, and no evidence of acute cortical infarct or intracranial hemorrhage. Neurology did not believe patient to have suffered a stroke and believe symptoms are more likely due to hypertensive encephalopathy.     Patient received labetalol 10mg in the ED with blood pressure to 163/101. Patient felt much better.  MRI was performed which showed no evidence of acute infarct, intracranial mass effect or midline shift. Enlargement of the lateral and 3rd ventricles are again noted with upward bowing of the corpus callosum and minimal periventricular T2/FLAIR hyperintense white matter changes which may be seen with transependymal edema. Correlation for normal pressure hydrocephalus is suggested.  MRI head and neck shows Mild luminal irregularity without measurable narrowing in the   proximal right internal carotid artery is most likely artifactual; however, fibromuscular dysplasia not excluded.      The following day, patient reported less symptoms and did not have a facial droop anymore.   Findings of MRI including possible NPH was conveyed to patient.  Patient did report worsening of ataxia/instability and confusion recently but no urinary symptoms.  Patient will be referred to a neurology for investigation of NPH.  Patient BP on discharge was 161/96.    Patient will be discharged on home meds but with Chlorthoralidone 12.5mg daily to help with his BP.   Patient should follow-up with PCP in a week to discuss medications.      Pertinent Physical Exam At Time of Discharge  Physical Exam  HENT:      Head: Normocephalic and atraumatic.     Eyes:      Extraocular Movements: Extraocular movements intact.       Cardiovascular:      Rate and Rhythm: Regular rhythm. Bradycardia present.   Pulmonary:      Effort: No respiratory distress.   Abdominal:      Tenderness: There is no abdominal tenderness.     Musculoskeletal:         General: No swelling.     Neurological:      Mental Status: He is alert and oriented to person, place, and time.      Comments: Facial expression intact bilaterally  No gross sensory deficits   Tongue normal and moving bilaterally         Outpatient Follow-Up  Future Appointments   Date Time Provider Department Center   12/2/2025 11:00 AM Mil Steele MD [id number] [facility]   7/15/2026 10:40 AM Mil Steele MD [id number] [facility]         Kalpesh Oneal MD   Griseofulvin Counseling:  I discussed with the patient the risks of griseofulvin including but not limited to photosensitivity, cytopenia, liver damage, nausea/vomiting and severe allergy.  The patient understands that this medication is best absorbed when taken with a fatty meal (e.g., ice cream or french fries).

## 2025-08-03 NOTE — PROGRESS NOTES
"Occupational Therapy                 Therapy Communication Note    Patient Name: Jeff Rayo  MRN: [id number]  Department: [address]  Room: 223/223-B  Today's Date: 8/3/2025     Discipline: Occupational Therapy    Missed Visit: OT Missed Visit: Yes     Missed Visit Reason: Missed Visit Reason:  (screen)    Missed Time: Cancel/Screen    Comment:Order received, chart review completed. Communication with RN; pt independent in room with ambulation and ADLs. Communication with pt; pt declined OT services stating \"I'm good\" . No acute OT needs at this time. OT order will be canceled.       "

## 2025-08-03 NOTE — CARE PLAN
Problem: Pain - Adult  Goal: Verbalizes/displays adequate comfort level or baseline comfort level  8/3/2025 0842 by Juliet Manzano RN  Outcome: Progressing  8/2/2025 1845 by Juliet Manzano RN  Outcome: Progressing  8/2/2025 1844 by Juliet Manzano RN  Outcome: Progressing     Problem: Safety - Adult  Goal: Free from fall injury  8/3/2025 0842 by Juliet Manzano RN  Outcome: Progressing  8/2/2025 1845 by Juliet Manzano RN  Outcome: Progressing  8/2/2025 1844 by Juliet Manzano RN  Outcome: Progressing     Problem: Discharge Planning  Goal: Discharge to home or other facility with appropriate resources  8/3/2025 0842 by Juliet Manzano RN  Outcome: Progressing  8/2/2025 1845 by Juliet Manzano RN  Outcome: Progressing  8/2/2025 1844 by Juliet Manzano RN  Outcome: Progressing     Problem: Chronic Conditions and Co-morbidities  Goal: Patient's chronic conditions and co-morbidity symptoms are monitored and maintained or improved  8/3/2025 0842 by Juliet Manzano RN  Outcome: Progressing  8/2/2025 1845 by Juliet Manzano RN  Outcome: Progressing  8/2/2025 1844 by Juliet Manzano RN  Outcome: Progressing     Problem: Nutrition  Goal: Nutrient intake appropriate for maintaining nutritional needs  8/3/2025 0842 by Juliet Manzano RN  Outcome: Progressing  8/2/2025 1845 by Juliet Manzano RN  Outcome: Progressing  8/2/2025 1844 by Juliet Manzano RN  Outcome: Progressing     Problem: General Stroke  Goal: Establish a mutual long term goal with patient by discharge  8/3/2025 0842 by Juliet Manzano RN  Outcome: Progressing  8/2/2025 1845 by Juliet Manzano RN  Outcome: Progressing  Goal: Demonstrate improvement in neurological exam throughout the shift  8/3/2025 0842 by Juliet Manzano RN  Outcome: Progressing  8/2/2025 1845 by Juliet Manzano RN  Outcome: Progressing  Goal: Maintain BP within ordered limits throughout shift  8/3/2025 0842 by Juliet Manzano RN  Outcome:  Progressing  8/2/2025 1845 by Juliet Manzano RN  Outcome: Progressing  Goal: Participate in treatment (ie., meds, therapy) throughout shift  8/3/2025 0842 by Juliet Manzano RN  Outcome: Progressing  8/2/2025 1845 by Juliet Manzano RN  Outcome: Progressing  Goal: No symptoms of aspiration throughout shift  8/3/2025 0842 by Juliet Manzano RN  Outcome: Progressing  8/2/2025 1845 by Juliet Manzano RN  Outcome: Progressing  Goal: No symptoms of hemorrhage throughout shift  8/3/2025 0842 by Juliet Manzano RN  Outcome: Progressing  8/2/2025 1845 by Juliet Manzano RN  Outcome: Progressing  Goal: Tolerate enteral feeding throughout shift  8/3/2025 0842 by Juliet Manzano RN  Outcome: Progressing  8/2/2025 1845 by Juliet Manzano RN  Outcome: Progressing  Goal: Decreased nausea/vomiting throughout shift  8/3/2025 0842 by Juliet Manzano RN  Outcome: Progressing  8/2/2025 1845 by Juliet Manzano RN  Outcome: Progressing  Goal: Controlled blood glucose throughout shift  8/3/2025 0842 by Juliet Manzano RN  Outcome: Progressing  8/2/2025 1845 by Juliet Manzano RN  Outcome: Progressing  Goal: Out of bed three times today  8/3/2025 0842 by Juliet Manzano RN  Outcome: Progressing  8/2/2025 1845 by Juliet Manzano RN  Outcome: Progressing   The patient's goals for the shift include  to keep BP down     The clinical goals for the shift include pt blood pressure to remain SBP less than 160

## 2025-08-03 NOTE — DISCHARGE INSTRUCTIONS
Please, take your home medications as instructed after being discharged from the hospital. The following changes will be described below.     NEW MEDICATIONS:    Start chlorthalidone 12.5 mg daily for better BP control.    OTHER INSTRUCTIONS:    Talk with your PCP about continuing/changing hypertensive medications once you follow up with him.    We have ordered a neurology referral to Dr. Recio, the neurologist who works at [facility].    UPCOMING APPOINTMENTS:     Please, follow-up with your PCP within 7 to 14 days after leaving the hospital. /appointment services has been requested to make an appointment for you, however if you do not hear back from them within 1 to 2 days, please call your primary physician's office to schedule an appointment. Bring your photo ID and insurance card to your appointment.   [facility]  services can be reached at [phone].     If you experience any worsening symptoms or have any concerns, please contact your primary care provider to schedule an appointment. If you cannot get in touch with your primary care physician, please return to the nearest emergency room or urgent care clinic for an evaluation and treatment.     Thank you for choosing [facility] and allowing us to partake in your medical care!     - Your [facility] inpatient primary care team.

## 2025-08-03 NOTE — PROGRESS NOTES
Physical Therapy                 Therapy Communication Note    Patient Name: Jeff Rayo  MRN: [id number]  Department: [address]  [facility]  Today's Date: 8/3/2025     Discipline: Physical Therapy    Missed Visit: PT Missed Visit: Yes     Missed Visit Reason: Missed Visit Reason: Cancel (Per pt and nurse, pt has been independently ambulating within room. Pt states he is at his baseline mobility level and has no concerns for discharge at this time.)    Missed Time: Cancel/ Screen at 0824

## 2025-08-03 NOTE — HOSPITAL COURSE
Jeff Rayo is a 65 y.o. year old male  patient with PMH of HLD, HTN, IBS, MARYANN, GERD, cluster headaches, and anxiety presenting for nausea/vomiting in the setting of elevated blood pressure.  Patient has a chronic history of hypertension and typically becomes disoriented, nauseous and lightheaded whenever his blood pressure increases significantly.  He began to experience this when he woke up 8/2 morning.  He was found to have systolic blood pressure elevated greater than 200 on home cuff.  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  As such, he contacted EMS and was transferred to [facility].  During his EMS trip, the patient was given 1 dose of sublingual nitroglycerin.  According to the patient, within the past year he was undergoing several medication changes by his PCP, including his blood pressure medicine.       Per ED staff, patient's NIHSS was initially 3 upon presentation to the ED for left-sided lower facial droop and numbness/tingling in left arm.  ED staff obtained a CT brain attack and spoke with Dr. Manuel from neurology.  CT showed stable moderate bilateral ventriculomegaly, and no evidence of acute cortical infarct or intracranial hemorrhage. Neurology did not believe patient to have suffered a stroke and believe symptoms are more likely due to hypertensive encephalopathy.     Patient received labetalol 10mg in the ED with blood pressure to 163/101. Patient felt much better.  MRI was performed which showed no evidence of acute infarct, intracranial mass effect or midline shift. Enlargement of the lateral and 3rd ventricles are again noted with upward bowing of the corpus callosum and minimal periventricular T2/FLAIR hyperintense white matter changes which may be seen with transependymal edema. Correlation for normal pressure hydrocephalus is suggested.  MRI head and neck shows Mild luminal irregularity without measurable narrowing in the    proximal right internal carotid artery is most likely artifactual; however, fibromuscular dysplasia not excluded.      The following day, patient reported less symptoms and did not have a facial droop anymore.  Findings of MRI including possible NPH was conveyed to patient.  Patient did report worsening of ataxia/instability and confusion recently but no urinary symptoms.  Patient will be referred to a neurology for investigation of NPH.  Patient BP on discharge was 161/96.    Patient will be discharged on home meds but with Chlorthoralidone 12.5mg daily to help with his BP.   Patient should follow-up with PCP in a week to discuss medications.

## 2025-08-03 NOTE — CARE PLAN
Problem: General Stroke  Goal: Demonstrate improvement in neurological exam throughout the shift  Outcome: Progressing  Goal: Maintain BP within ordered limits throughout shift  Outcome: Progressing   The patient's goals for the shift include      No neuro deficits this shift. BP well maintained overnight. Had MRI this shift. No c/o pain

## 2025-08-03 NOTE — PROGRESS NOTES
08/03/25 1414   Discharge Planning   Living Arrangements Alone   Support Systems Family members   Assistance Needed A&ox4, independent with ADLs, no DME, drives, room air , no cpap or bipap, not active with any HHC, PCP is Mil Steele   Type of Residence Private residence   Number of Stairs to Enter Residence 5   Number of Stairs Within Residence 12   Do you have animals or pets at home? Yes   Type of Animals or Pets 1 dog   Who is requesting discharge planning? Provider   Home or Post Acute Services None   Expected Discharge Disposition Home  (No needs per PT eval. Patient denies any HHC needs. ADOD today.)   Does the patient need discharge transport arranged? No   Financial Resource Strain   How hard is it for you to pay for the very basics like food, housing, medical care, and heating? Somewhat   Housing Stability   In the last 12 months, was there a time when you were not able to pay the mortgage or rent on time? N   In the past 12 months, how many times have you moved where you were living? 0   At any time in the past 12 months, were you homeless or living in a shelter (including now)? N   Transportation Needs   In the past 12 months, has lack of transportation kept you from medical appointments or from getting medications? no   In the past 12 months, has lack of transportation kept you from meetings, work, or from getting things needed for daily living? No   Stroke Family Assessment   Stroke Family Assessment Needed No   Intensity of Service   Intensity of Service 0-30 min

## 2025-08-03 NOTE — NURSING NOTE
Discharge instructions reviewed with patient. No questions at this time. Education given for new homegoing medications with type, uses, and most common side effects. Patient verbalized understanding. Handout on HTN & Stroke given. Telemetry removed and left at nurses station, masimo removed and left at bedside. IV removed. Discharged home in stable condition.

## 2025-08-04 LAB
ATRIAL RATE: 63 BPM
P AXIS: 61 DEGREES
P OFFSET: 194 MS
P ONSET: 133 MS
PR INTERVAL: 168 MS
Q ONSET: 217 MS
QRS COUNT: 10 BEATS
QRS DURATION: 88 MS
QT INTERVAL: 448 MS
QTC CALCULATION(BAZETT): 458 MS
QTC FREDERICIA: 455 MS
R AXIS: 15 DEGREES
T AXIS: 53 DEGREES
T OFFSET: 441 MS
VENTRICULAR RATE: 63 BPM

## 2025-08-05 ENCOUNTER — TELEPHONE (OUTPATIENT)
Dept: PRIMARY CARE | Facility: CLINIC | Age: 65
End: 2025-08-05

## 2025-08-05 ENCOUNTER — OFFICE VISIT (OUTPATIENT)
Dept: PRIMARY CARE | Facility: CLINIC | Age: 65
End: 2025-08-05
Payer: COMMERCIAL

## 2025-08-05 VITALS
HEIGHT: 69 IN | DIASTOLIC BLOOD PRESSURE: 104 MMHG | OXYGEN SATURATION: 97 % | HEART RATE: 60 BPM | WEIGHT: 195 LBS | BODY MASS INDEX: 28.88 KG/M2 | TEMPERATURE: 97.7 F | SYSTOLIC BLOOD PRESSURE: 172 MMHG

## 2025-08-05 DIAGNOSIS — I16.1 HYPERTENSIVE EMERGENCY: ICD-10-CM

## 2025-08-05 DIAGNOSIS — F41.9 ANXIETY: Primary | ICD-10-CM

## 2025-08-05 DIAGNOSIS — I10 PRIMARY HYPERTENSION: ICD-10-CM

## 2025-08-05 PROCEDURE — 3080F DIAST BP >= 90 MM HG: CPT | Performed by: FAMILY MEDICINE

## 2025-08-05 PROCEDURE — 3077F SYST BP >= 140 MM HG: CPT | Performed by: FAMILY MEDICINE

## 2025-08-05 PROCEDURE — 99495 TRANSJ CARE MGMT MOD F2F 14D: CPT | Performed by: FAMILY MEDICINE

## 2025-08-05 PROCEDURE — 1036F TOBACCO NON-USER: CPT | Performed by: FAMILY MEDICINE

## 2025-08-05 PROCEDURE — 3008F BODY MASS INDEX DOCD: CPT | Performed by: FAMILY MEDICINE

## 2025-08-05 RX ORDER — VERAPAMIL HYDROCHLORIDE 180 MG/1
180 TABLET, EXTENDED RELEASE ORAL 2 TIMES DAILY
Qty: 60 TABLET | Refills: 11 | Status: SHIPPED | OUTPATIENT
Start: 2025-08-05 | End: 2025-08-05 | Stop reason: ALTCHOICE

## 2025-08-05 RX ORDER — CHLORTHALIDONE 25 MG/1
25 TABLET ORAL DAILY
Qty: 90 TABLET | Refills: 3 | Status: SHIPPED | OUTPATIENT
Start: 2025-08-05 | End: 2026-08-05

## 2025-08-05 RX ORDER — ESCITALOPRAM OXALATE 20 MG/1
40 TABLET ORAL DAILY
Qty: 180 TABLET | Refills: 3 | Status: SHIPPED | OUTPATIENT
Start: 2025-08-05 | End: 2026-08-05

## 2025-08-05 NOTE — ASSESSMENT & PLAN NOTE
shock of his wife leaving 6/2020    Did not tolerate trial of buspar 7/2025 -- caused high blood pressure.    Tolerated restarting lexapro.

## 2025-08-05 NOTE — TELEPHONE ENCOUNTER
Pharmacy has a question about his Chlorthalidone script.  They said 12.5 is not a usual dose they have, but they do have 25.  They need you to ok the switch to 25mg tab

## 2025-08-05 NOTE — ASSESSMENT & PLAN NOTE
Up with hypertensive encephalopathy 8/2/25.    Tolerating losartan to 100 mg daily.   Increase verapamil to 180 mg TWICE a day (360 mg total daily dose)  Increase chlorthalidone to 25 mg daily and Recheck labs in 2 weeks.     Stay Off plendil (felodipine) -- does not need duplicate CCB.

## 2025-08-05 NOTE — PROGRESS NOTES
"Subjective   Patient ID: Jeff Rayo is a 65 y.o. male who presents for hospital follow up -- discharged 8/3/25 -- TCM communication performed yesterday 8/4/25.  Here for TCM visit.     8/2-8/3/25 Coffee: Hypertensive encephalopathy, NO cva per MRI, facial droop resolved with BP control.  F/U Neuro. BP on discharg 161/96.  Resume home meds verapamil 180, losartan 100, add chlorthalidone 12.5.      Objective   Visit Vitals  BP (!) 172/104   Pulse 60   Temp 36.5 °C (97.7 °F)   Ht 1.753 m (5' 9\")   Wt 88.5 kg (195 lb)   SpO2 97%   BMI 28.80 kg/m²   Smoking Status Never   BSA 2.08 m²      O: VSS AFEB Awake, Alert, NAD.  No intoxication, withdrawal, or sedation.  Chest CTA.  Heart RRR.  Ext no c/c/e.      Last Labs:     CMP:   Lab Results   Component Value Date    CALCIUM 8.6 08/03/2025    CALCIUM 9.0 08/02/2025    CALCIUM 9.0 07/18/2025    PHOS 4.5 08/03/2025    PROT 6.7 08/02/2025    PROT 6.4 07/18/2025    PROT 6.7 11/27/2024    ALBUMIN 3.9 08/03/2025    ALBUMIN 4.7 08/02/2025    ALBUMIN 4.5 07/18/2025    AST 19 08/02/2025    AST 22 07/18/2025    AST 20 11/27/2024    ALKPHOS 85 08/02/2025    ALKPHOS 88 07/18/2025    ALKPHOS 85 11/27/2024    BILITOT 0.6 08/02/2025    BILITOT 0.6 07/18/2025    BILITOT 0.8 11/27/2024     CBC:   Lab Results   Component Value Date    WBC 4.3 (L) 08/03/2025    WBC 4.7 08/02/2025    WBC 4.8 07/18/2025    HGB 14.3 08/03/2025    HGB 15.3 08/02/2025    HGB 15.1 07/18/2025    HCT 41.3 08/03/2025    HCT 42.8 08/02/2025    HCT 45.0 07/18/2025    MCV 91 08/03/2025    MCV 88 08/02/2025    MCV 93.8 07/18/2025     08/03/2025     08/02/2025     07/18/2025     A1C:   Lab Results   Component Value Date    HGBA1C 4.9 08/02/2025     LIPID PANEL:   Lab Results   Component Value Date    CHOL 215 (H) 07/18/2025    CHOL 226 (H) 11/27/2024    CHOL 237 (H) 11/22/2023    TRIG 41 07/18/2025    TRIG 50 11/27/2024    TRIG 75 11/22/2023    HDL 96 07/18/2025    HDL 88.7 11/27/2024    HDL " 98.0 11/22/2023    CHHDL 2.2 07/18/2025    CHHDL 2.5 11/27/2024    CHHDL 2.4 11/22/2023    LDLF 101 (H) 07/13/2022    LDLF 110 (H) 11/04/2021    VLDL 10 11/27/2024    VLDL 15 11/22/2023    NHDL 119 07/18/2025    NHDL 137 11/27/2024    NHDL 139 11/22/2023     TSH:   Lab Results   Component Value Date    TSH 2.06 07/13/2022    TSH 1.83 11/04/2021     PSA:   Lab Results   Component Value Date    PSA 0.62 11/22/2023       Assessment/Plan   Problem List Items Addressed This Visit       Anxiety - Primary    Overview   Hypertensive crisis with Buspar trial (7/2025)  Did not tolerate prozac, welbutrin, effexor in the past.   Mild sexual side effects with lexapro.          Current Assessment & Plan   shock of his wife leaving 6/2020    Did not tolerate trial of buspar 7/2025 -- caused high blood pressure.    Tolerated restarting lexapro.              Relevant Medications    escitalopram (Lexapro) 20 mg tablet    Hypertension    Overview   Choice of verapamil for control of cluster headaches.          Current Assessment & Plan   Up with hypertensive encephalopathy 8/2/25.    Tolerating losartan to 100 mg daily.   Increase verapamil to 180 mg TWICE a day (360 mg total daily dose)  Increase chlorthalidone to 25 mg daily and Recheck labs in 2 weeks.     Stay Off plendil (felodipine) -- does not need duplicate CCB.         Relevant Medications    chlorthalidone 12.5 mg tablet    Other Relevant Orders    Comprehensive metabolic panel     Other Visit Diagnoses         Hypertensive emergency        Relevant Medications    chlorthalidone 12.5 mg tablet

## 2025-08-19 DIAGNOSIS — I10 PRIMARY HYPERTENSION: ICD-10-CM

## 2025-08-26 ENCOUNTER — APPOINTMENT (OUTPATIENT)
Dept: NEUROLOGY | Facility: CLINIC | Age: 65
End: 2025-08-26
Payer: COMMERCIAL

## 2025-08-26 VITALS
DIASTOLIC BLOOD PRESSURE: 68 MMHG | HEIGHT: 69 IN | HEART RATE: 69 BPM | SYSTOLIC BLOOD PRESSURE: 114 MMHG | BODY MASS INDEX: 28.44 KG/M2 | WEIGHT: 192 LBS

## 2025-08-26 DIAGNOSIS — I67.4 HYPERTENSIVE ENCEPHALOPATHY: ICD-10-CM

## 2025-08-26 DIAGNOSIS — G91.2 NORMAL PRESSURE HYDROCEPHALUS (MULTI): Primary | ICD-10-CM

## 2025-08-26 PROCEDURE — 3074F SYST BP LT 130 MM HG: CPT | Performed by: PSYCHIATRY & NEUROLOGY

## 2025-08-26 PROCEDURE — 3008F BODY MASS INDEX DOCD: CPT | Performed by: PSYCHIATRY & NEUROLOGY

## 2025-08-26 PROCEDURE — 3078F DIAST BP <80 MM HG: CPT | Performed by: PSYCHIATRY & NEUROLOGY

## 2025-08-26 PROCEDURE — 1036F TOBACCO NON-USER: CPT | Performed by: PSYCHIATRY & NEUROLOGY

## 2025-08-26 PROCEDURE — 99204 OFFICE O/P NEW MOD 45 MIN: CPT | Performed by: PSYCHIATRY & NEUROLOGY

## 2025-09-10 ENCOUNTER — APPOINTMENT (OUTPATIENT)
Dept: PRIMARY CARE | Facility: CLINIC | Age: 65
End: 2025-09-10
Payer: COMMERCIAL

## 2025-10-15 ENCOUNTER — APPOINTMENT (OUTPATIENT)
Dept: PRIMARY CARE | Facility: CLINIC | Age: 65
End: 2025-10-15
Payer: COMMERCIAL

## 2025-12-02 ENCOUNTER — APPOINTMENT (OUTPATIENT)
Dept: PRIMARY CARE | Facility: CLINIC | Age: 65
End: 2025-12-02
Payer: COMMERCIAL

## 2026-03-12 ENCOUNTER — APPOINTMENT (OUTPATIENT)
Dept: NEUROLOGY | Facility: CLINIC | Age: 66
End: 2026-03-12
Payer: COMMERCIAL

## 2026-07-15 ENCOUNTER — APPOINTMENT (OUTPATIENT)
Dept: PRIMARY CARE | Facility: CLINIC | Age: 66
End: 2026-07-15
Payer: COMMERCIAL